# Patient Record
Sex: FEMALE | Race: WHITE | NOT HISPANIC OR LATINO | Employment: OTHER | URBAN - METROPOLITAN AREA
[De-identification: names, ages, dates, MRNs, and addresses within clinical notes are randomized per-mention and may not be internally consistent; named-entity substitution may affect disease eponyms.]

---

## 2017-04-05 ENCOUNTER — ALLSCRIPTS OFFICE VISIT (OUTPATIENT)
Dept: OTHER | Facility: OTHER | Age: 67
End: 2017-04-05

## 2017-04-10 RX ORDER — LOSARTAN POTASSIUM 25 MG/1
25 TABLET ORAL DAILY
COMMUNITY

## 2017-04-10 RX ORDER — ATORVASTATIN CALCIUM 40 MG/1
40 TABLET, FILM COATED ORAL DAILY
COMMUNITY

## 2017-04-10 RX ORDER — ASPIRIN 81 MG/1
81 TABLET ORAL DAILY
COMMUNITY

## 2017-04-10 RX ORDER — CARVEDILOL 12.5 MG/1
12.5 TABLET ORAL 2 TIMES DAILY WITH MEALS
COMMUNITY

## 2017-04-10 RX ORDER — CLOPIDOGREL BISULFATE 75 MG/1
75 TABLET ORAL DAILY
COMMUNITY

## 2017-04-16 ENCOUNTER — ANESTHESIA EVENT (OUTPATIENT)
Dept: PERIOP | Facility: HOSPITAL | Age: 67
End: 2017-04-16
Payer: MEDICARE

## 2017-04-17 ENCOUNTER — HOSPITAL ENCOUNTER (OUTPATIENT)
Facility: HOSPITAL | Age: 67
Setting detail: OUTPATIENT SURGERY
Discharge: HOME | End: 2017-04-17
Attending: INTERNAL MEDICINE | Admitting: INTERNAL MEDICINE
Payer: MEDICARE

## 2017-04-17 ENCOUNTER — ANESTHESIA (OUTPATIENT)
Dept: PERIOP | Facility: HOSPITAL | Age: 67
End: 2017-04-17
Payer: MEDICARE

## 2017-04-17 ENCOUNTER — GENERIC CONVERSION - ENCOUNTER (OUTPATIENT)
Dept: OTHER | Facility: OTHER | Age: 67
End: 2017-04-17

## 2017-04-17 VITALS
OXYGEN SATURATION: 99 % | RESPIRATION RATE: 20 BRPM | TEMPERATURE: 97 F | HEART RATE: 62 BPM | WEIGHT: 168.65 LBS | HEIGHT: 68 IN | BODY MASS INDEX: 25.56 KG/M2 | DIASTOLIC BLOOD PRESSURE: 65 MMHG | SYSTOLIC BLOOD PRESSURE: 131 MMHG

## 2017-04-17 DIAGNOSIS — R09.89 GLOBUS SENSATION: ICD-10-CM

## 2017-04-17 DIAGNOSIS — K22.89 ESOPHAGEAL THICKENING: ICD-10-CM

## 2017-04-17 PROCEDURE — 88342 IMHCHEM/IMCYTCHM 1ST ANTB: CPT | Performed by: INTERNAL MEDICINE

## 2017-04-17 PROCEDURE — 88305 TISSUE EXAM BY PATHOLOGIST: CPT | Performed by: INTERNAL MEDICINE

## 2017-04-17 RX ORDER — SODIUM CHLORIDE, SODIUM LACTATE, POTASSIUM CHLORIDE, CALCIUM CHLORIDE 600; 310; 30; 20 MG/100ML; MG/100ML; MG/100ML; MG/100ML
125 INJECTION, SOLUTION INTRAVENOUS CONTINUOUS
Status: DISCONTINUED | OUTPATIENT
Start: 2017-04-17 | End: 2017-04-17 | Stop reason: HOSPADM

## 2017-04-17 RX ORDER — PROPOFOL 10 MG/ML
INJECTION, EMULSION INTRAVENOUS AS NEEDED
Status: DISCONTINUED | OUTPATIENT
Start: 2017-04-17 | End: 2017-04-17 | Stop reason: SURG

## 2017-04-17 RX ADMIN — PROPOFOL 100 MG: 10 INJECTION, EMULSION INTRAVENOUS at 10:34

## 2017-04-17 RX ADMIN — SODIUM CHLORIDE, SODIUM LACTATE, POTASSIUM CHLORIDE, AND CALCIUM CHLORIDE: .6; .31; .03; .02 INJECTION, SOLUTION INTRAVENOUS at 09:45

## 2017-04-17 RX ADMIN — PROPOFOL 20 MG: 10 INJECTION, EMULSION INTRAVENOUS at 10:37

## 2017-04-20 ENCOUNTER — GENERIC CONVERSION - ENCOUNTER (OUTPATIENT)
Dept: OTHER | Facility: OTHER | Age: 67
End: 2017-04-20

## 2017-07-06 DIAGNOSIS — K29.70 GASTRITIS WITHOUT BLEEDING: ICD-10-CM

## 2017-12-06 ENCOUNTER — GENERIC CONVERSION - ENCOUNTER (OUTPATIENT)
Dept: OTHER | Facility: OTHER | Age: 67
End: 2017-12-06

## 2018-01-10 NOTE — RESULT NOTES
Verified Results  (1) TISSUE EXAM [id number] 10:35AM Jazzy Hunt     Test Name Result Flag Reference   LAB AP CASE REPORT (Report)     Surgical Pathology Report             Case: [id number]                   Authorizing Provider: Irma Patiño MD  Collected:      04/17/2017 1035        Ordering Location:   [facility] Received:      04/17/2017 1304                    Operating Room                                 Pathologist:      Karlene Schlatter, MD                               Specimens:  A) - Duodenum, Duodenum, r/o C sprue                                  B) - Stomach, Stomach, r/o H pylori   LAB AP FINAL DIAGNOSIS (Report)     A  Duodenum, biopsy:   - No significant pathologic abnormalities   - Features of gluten-sensitive enteropathy (celiac disease) and other   malabsorptive enteropathies are not identified   - No active inflammation, granulomas, dysplasia, or malignancy identified    B  Stomach, biopsy:   - Chronic active H pylori gastritis   - Numerous H pylori organisms are identified on immunostain and H&E   - No intestinal metaplasia, dysplasia, or malignancy identified    Electronically signed by Karlene Schlatter, MD on 4/19/2017 at 11:48 AM   LAB AP NOTE      Immunohistochemical stains are performed with adequate controls  LAB AP SURGICAL ADDITIONAL INFORMATION (Report)     These tests were developed and their performance characteristics   determined by [facility] or [employer]  They may not be cleared or approved by the [place]  Food and   Drug Administration  The FDA has determined that such clearance or   approval is not necessary  These tests are used for clinical purposes  They should not be regarded as investigational or for research  This   laboratory has been approved by CLIA 88, designated as a high-complexity   laboratory and is qualified to perform these tests      Interpretation performed at [place] [address]  [facility]   [id number]   LAB AP GROSS DESCRIPTION (Report)     A  The specimen is received in formalin, labeled with the patient's name   and hospital number, and is designated duodenum biopsy  The specimen   consists of 4 tan-pink soft tissue fragments measuring 0 2 cm, 0 2 cm, 0 3   cm, and 0 3 cm in greatest dimension  Entirely submitted  One cassette  B  The specimen is received in formalin, labeled with the patient's name   and hospital number, and is designated stomach biopsy  The specimen   consists of multiple tan-pink soft tissue fragments measuring in aggregate   0 4 x 0 4 x 0 2 cm  Entirely submitted  One cassette  Note: The estimated total formalin fixation time based upon information   provided by the submitting clinician and the standard processing schedule   is 27 5 hours    [facility]   LAB AP CLINICAL INFORMATION      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

## 2018-01-13 VITALS
HEIGHT: 68 IN | DIASTOLIC BLOOD PRESSURE: 80 MMHG | WEIGHT: 168.13 LBS | SYSTOLIC BLOOD PRESSURE: 122 MMHG | BODY MASS INDEX: 25.48 KG/M2 | HEART RATE: 64 BPM

## 2022-06-09 ENCOUNTER — APPOINTMENT (EMERGENCY)
Dept: RADIOLOGY | Facility: HOSPITAL | Age: 72
DRG: 189 | End: 2022-06-09
Payer: MEDICARE

## 2022-06-09 ENCOUNTER — HOSPITAL ENCOUNTER (INPATIENT)
Facility: HOSPITAL | Age: 72
LOS: 2 days | Discharge: HOME | DRG: 189 | End: 2022-06-11
Attending: EMERGENCY MEDICINE | Admitting: INTERNAL MEDICINE
Payer: MEDICARE

## 2022-06-09 DIAGNOSIS — R09.02 HYPOXIA: Primary | ICD-10-CM

## 2022-06-09 DIAGNOSIS — J44.1 COPD WITH ACUTE EXACERBATION (HCC): ICD-10-CM

## 2022-06-09 PROBLEM — I50.32 CHRONIC DIASTOLIC CHF (CONGESTIVE HEART FAILURE) (HCC): Status: ACTIVE | Noted: 2019-11-29

## 2022-06-09 PROBLEM — I25.10 CORONARY ARTERY DISEASE: Status: ACTIVE | Noted: 2022-06-09

## 2022-06-09 PROBLEM — J96.01 ACUTE RESPIRATORY FAILURE WITH HYPOXIA (HCC): Status: ACTIVE | Noted: 2022-06-09

## 2022-06-09 PROBLEM — Z85.9 HISTORY OF CANCER: Status: ACTIVE | Noted: 2022-06-09

## 2022-06-09 LAB
2HR DELTA HS TROPONIN: -1 NG/L
4HR DELTA HS TROPONIN: -2 NG/L
ALBUMIN SERPL BCP-MCNC: 2.6 G/DL (ref 3.5–5)
ALP SERPL-CCNC: 49 U/L (ref 46–116)
ALT SERPL W P-5'-P-CCNC: 19 U/L (ref 12–78)
ANION GAP SERPL CALCULATED.3IONS-SCNC: 5 MMOL/L (ref 4–13)
AST SERPL W P-5'-P-CCNC: 8 U/L (ref 5–45)
ATRIAL RATE: 75 BPM
BASOPHILS # BLD AUTO: 0.01 THOUSANDS/ΜL (ref 0–0.1)
BASOPHILS NFR BLD AUTO: 0 % (ref 0–1)
BILIRUB DIRECT SERPL-MCNC: 0.27 MG/DL (ref 0–0.2)
BILIRUB SERPL-MCNC: 1.1 MG/DL (ref 0.2–1)
BILIRUB UR QL STRIP: NEGATIVE
BUN SERPL-MCNC: 14 MG/DL (ref 5–25)
CALCIUM SERPL-MCNC: 8.3 MG/DL (ref 8.3–10.1)
CARDIAC TROPONIN I PNL SERPL HS: 3 NG/L
CARDIAC TROPONIN I PNL SERPL HS: 4 NG/L
CARDIAC TROPONIN I PNL SERPL HS: 5 NG/L
CHLORIDE SERPL-SCNC: 105 MMOL/L (ref 100–108)
CLARITY UR: CLEAR
CO2 SERPL-SCNC: 31 MMOL/L (ref 21–32)
COLOR UR: YELLOW
CREAT SERPL-MCNC: 0.64 MG/DL (ref 0.6–1.3)
D DIMER PPP FEU-MCNC: <0.27 UG/ML FEU
EOSINOPHIL # BLD AUTO: 0.02 THOUSAND/ΜL (ref 0–0.61)
EOSINOPHIL NFR BLD AUTO: 0 % (ref 0–6)
ERYTHROCYTE [DISTWIDTH] IN BLOOD BY AUTOMATED COUNT: 14.4 % (ref 11.6–15.1)
FLUAV RNA RESP QL NAA+PROBE: NEGATIVE
FLUBV RNA RESP QL NAA+PROBE: NEGATIVE
GFR SERPL CREATININE-BSD FRML MDRD: 89 ML/MIN/1.73SQ M
GLUCOSE SERPL-MCNC: 78 MG/DL (ref 65–140)
GLUCOSE UR STRIP-MCNC: NEGATIVE MG/DL
HCT VFR BLD AUTO: 49 % (ref 34.8–46.1)
HGB BLD-MCNC: 16.1 G/DL (ref 11.5–15.4)
HGB UR QL STRIP.AUTO: NEGATIVE
IMM GRANULOCYTES # BLD AUTO: 0.03 THOUSAND/UL (ref 0–0.2)
IMM GRANULOCYTES NFR BLD AUTO: 0 % (ref 0–2)
KETONES UR STRIP-MCNC: NEGATIVE MG/DL
LEUKOCYTE ESTERASE UR QL STRIP: NEGATIVE
LYMPHOCYTES # BLD AUTO: 0.72 THOUSANDS/ΜL (ref 0.6–4.47)
LYMPHOCYTES NFR BLD AUTO: 7 % (ref 14–44)
MCH RBC QN AUTO: 32.5 PG (ref 26.8–34.3)
MCHC RBC AUTO-ENTMCNC: 32.9 G/DL (ref 31.4–37.4)
MCV RBC AUTO: 99 FL (ref 82–98)
MONOCYTES # BLD AUTO: 0.51 THOUSAND/ΜL (ref 0.17–1.22)
MONOCYTES NFR BLD AUTO: 5 % (ref 4–12)
NEUTROPHILS # BLD AUTO: 8.45 THOUSANDS/ΜL (ref 1.85–7.62)
NEUTS SEG NFR BLD AUTO: 88 % (ref 43–75)
NITRITE UR QL STRIP: NEGATIVE
NRBC BLD AUTO-RTO: 0 /100 WBCS
NT-PROBNP SERPL-MCNC: 192 PG/ML
P AXIS: 28 DEGREES
PH UR STRIP.AUTO: 6.5 [PH]
PLATELET # BLD AUTO: 144 THOUSANDS/UL (ref 149–390)
PMV BLD AUTO: 9.5 FL (ref 8.9–12.7)
POTASSIUM SERPL-SCNC: 4.5 MMOL/L (ref 3.5–5.3)
PR INTERVAL: 140 MS
PROCALCITONIN SERPL-MCNC: 0.05 NG/ML
PROT SERPL-MCNC: 5.9 G/DL (ref 6.4–8.2)
PROT UR STRIP-MCNC: NEGATIVE MG/DL
QRS AXIS: 78 DEGREES
QRSD INTERVAL: 76 MS
QT INTERVAL: 364 MS
QTC INTERVAL: 406 MS
RBC # BLD AUTO: 4.96 MILLION/UL (ref 3.81–5.12)
RSV RNA RESP QL NAA+PROBE: NEGATIVE
SARS-COV-2 RNA RESP QL NAA+PROBE: NEGATIVE
SODIUM SERPL-SCNC: 141 MMOL/L (ref 136–145)
SP GR UR STRIP.AUTO: 1.02 (ref 1–1.03)
T WAVE AXIS: 74 DEGREES
UROBILINOGEN UR QL STRIP.AUTO: 0.2 E.U./DL
VENTRICULAR RATE: 75 BPM
WBC # BLD AUTO: 9.74 THOUSAND/UL (ref 4.31–10.16)

## 2022-06-09 PROCEDURE — 80048 BASIC METABOLIC PNL TOTAL CA: CPT | Performed by: EMERGENCY MEDICINE

## 2022-06-09 PROCEDURE — 99291 CRITICAL CARE FIRST HOUR: CPT | Performed by: EMERGENCY MEDICINE

## 2022-06-09 PROCEDURE — 85025 COMPLETE CBC W/AUTO DIFF WBC: CPT | Performed by: EMERGENCY MEDICINE

## 2022-06-09 PROCEDURE — 84484 ASSAY OF TROPONIN QUANT: CPT | Performed by: EMERGENCY MEDICINE

## 2022-06-09 PROCEDURE — 81003 URINALYSIS AUTO W/O SCOPE: CPT | Performed by: EMERGENCY MEDICINE

## 2022-06-09 PROCEDURE — 93005 ELECTROCARDIOGRAM TRACING: CPT

## 2022-06-09 PROCEDURE — 36415 COLL VENOUS BLD VENIPUNCTURE: CPT | Performed by: EMERGENCY MEDICINE

## 2022-06-09 PROCEDURE — 94760 N-INVAS EAR/PLS OXIMETRY 1: CPT

## 2022-06-09 PROCEDURE — 84145 PROCALCITONIN (PCT): CPT | Performed by: EMERGENCY MEDICINE

## 2022-06-09 PROCEDURE — 93010 ELECTROCARDIOGRAM REPORT: CPT | Performed by: INTERNAL MEDICINE

## 2022-06-09 PROCEDURE — 85379 FIBRIN DEGRADATION QUANT: CPT | Performed by: EMERGENCY MEDICINE

## 2022-06-09 PROCEDURE — 99285 EMERGENCY DEPT VISIT HI MDM: CPT

## 2022-06-09 PROCEDURE — 1124F ACP DISCUSS-NO DSCNMKR DOCD: CPT | Performed by: EMERGENCY MEDICINE

## 2022-06-09 PROCEDURE — 94644 CONT INHLJ TX 1ST HOUR: CPT

## 2022-06-09 PROCEDURE — 80076 HEPATIC FUNCTION PANEL: CPT | Performed by: EMERGENCY MEDICINE

## 2022-06-09 PROCEDURE — 83880 ASSAY OF NATRIURETIC PEPTIDE: CPT | Performed by: EMERGENCY MEDICINE

## 2022-06-09 PROCEDURE — 99223 1ST HOSP IP/OBS HIGH 75: CPT | Performed by: INTERNAL MEDICINE

## 2022-06-09 PROCEDURE — 96361 HYDRATE IV INFUSION ADD-ON: CPT

## 2022-06-09 PROCEDURE — 96360 HYDRATION IV INFUSION INIT: CPT

## 2022-06-09 PROCEDURE — 94664 DEMO&/EVAL PT USE INHALER: CPT

## 2022-06-09 PROCEDURE — 71046 X-RAY EXAM CHEST 2 VIEWS: CPT

## 2022-06-09 PROCEDURE — 0241U HB NFCT DS VIR RESP RNA 4 TRGT: CPT | Performed by: EMERGENCY MEDICINE

## 2022-06-09 RX ORDER — CARVEDILOL 12.5 MG/1
12.5 TABLET ORAL 2 TIMES DAILY WITH MEALS
Status: DISCONTINUED | OUTPATIENT
Start: 2022-06-09 | End: 2022-06-11 | Stop reason: HOSPADM

## 2022-06-09 RX ORDER — NICOTINE 21 MG/24HR
1 PATCH, TRANSDERMAL 24 HOURS TRANSDERMAL DAILY
Status: DISCONTINUED | OUTPATIENT
Start: 2022-06-10 | End: 2022-06-11 | Stop reason: HOSPADM

## 2022-06-09 RX ORDER — CLOPIDOGREL BISULFATE 75 MG/1
75 TABLET ORAL DAILY
Status: DISCONTINUED | OUTPATIENT
Start: 2022-06-10 | End: 2022-06-11 | Stop reason: HOSPADM

## 2022-06-09 RX ORDER — ALBUTEROL SULFATE 90 UG/1
2 AEROSOL, METERED RESPIRATORY (INHALATION) EVERY 6 HOURS PRN
Status: DISCONTINUED | OUTPATIENT
Start: 2022-06-09 | End: 2022-06-11 | Stop reason: HOSPADM

## 2022-06-09 RX ORDER — METHYLPREDNISOLONE SODIUM SUCCINATE 40 MG/ML
40 INJECTION, POWDER, LYOPHILIZED, FOR SOLUTION INTRAMUSCULAR; INTRAVENOUS EVERY 8 HOURS SCHEDULED
Status: DISCONTINUED | OUTPATIENT
Start: 2022-06-10 | End: 2022-06-10

## 2022-06-09 RX ORDER — SODIUM CHLORIDE FOR INHALATION 0.9 %
12 VIAL, NEBULIZER (ML) INHALATION ONCE
Status: COMPLETED | OUTPATIENT
Start: 2022-06-09 | End: 2022-06-09

## 2022-06-09 RX ORDER — ENOXAPARIN SODIUM 100 MG/ML
40 INJECTION SUBCUTANEOUS DAILY
Status: DISCONTINUED | OUTPATIENT
Start: 2022-06-10 | End: 2022-06-11 | Stop reason: HOSPADM

## 2022-06-09 RX ORDER — METHYLPREDNISOLONE SODIUM SUCCINATE 125 MG/2ML
125 INJECTION, POWDER, LYOPHILIZED, FOR SOLUTION INTRAMUSCULAR; INTRAVENOUS ONCE
Status: COMPLETED | OUTPATIENT
Start: 2022-06-09 | End: 2022-06-09

## 2022-06-09 RX ORDER — LEVALBUTEROL 1.25 MG/.5ML
1.25 SOLUTION, CONCENTRATE RESPIRATORY (INHALATION)
Status: DISCONTINUED | OUTPATIENT
Start: 2022-06-09 | End: 2022-06-09

## 2022-06-09 RX ORDER — ASPIRIN 81 MG/1
81 TABLET ORAL DAILY
Status: DISCONTINUED | OUTPATIENT
Start: 2022-06-10 | End: 2022-06-11 | Stop reason: HOSPADM

## 2022-06-09 RX ORDER — ACETAMINOPHEN 325 MG/1
650 TABLET ORAL EVERY 6 HOURS PRN
Status: DISCONTINUED | OUTPATIENT
Start: 2022-06-09 | End: 2022-06-11 | Stop reason: HOSPADM

## 2022-06-09 RX ORDER — ONDANSETRON 2 MG/ML
4 INJECTION INTRAMUSCULAR; INTRAVENOUS EVERY 6 HOURS PRN
Status: DISCONTINUED | OUTPATIENT
Start: 2022-06-09 | End: 2022-06-11 | Stop reason: HOSPADM

## 2022-06-09 RX ORDER — LOSARTAN POTASSIUM 25 MG/1
25 TABLET ORAL DAILY
Status: DISCONTINUED | OUTPATIENT
Start: 2022-06-10 | End: 2022-06-11 | Stop reason: HOSPADM

## 2022-06-09 RX ORDER — ATORVASTATIN CALCIUM 40 MG/1
40 TABLET, FILM COATED ORAL DAILY
Status: DISCONTINUED | OUTPATIENT
Start: 2022-06-10 | End: 2022-06-11 | Stop reason: HOSPADM

## 2022-06-09 RX ADMIN — IPRATROPIUM BROMIDE 1 MG: 0.5 SOLUTION RESPIRATORY (INHALATION) at 13:29

## 2022-06-09 RX ADMIN — ALBUTEROL SULFATE 10 MG: 2.5 SOLUTION RESPIRATORY (INHALATION) at 13:29

## 2022-06-09 RX ADMIN — METHYLPREDNISOLONE SODIUM SUCCINATE 125 MG: 125 INJECTION, POWDER, FOR SOLUTION INTRAMUSCULAR; INTRAVENOUS at 17:43

## 2022-06-09 RX ADMIN — CARVEDILOL 12.5 MG: 12.5 TABLET, FILM COATED ORAL at 17:44

## 2022-06-09 RX ADMIN — SODIUM CHLORIDE 1000 ML: 0.9 INJECTION, SOLUTION INTRAVENOUS at 13:16

## 2022-06-09 RX ADMIN — ISODIUM CHLORIDE 12 ML: 0.03 SOLUTION RESPIRATORY (INHALATION) at 13:29

## 2022-06-09 RX ADMIN — DICLOFENAC SODIUM TOPICAL GEL, 1%, 2 G: 10 GEL TOPICAL at 19:43

## 2022-06-09 RX ADMIN — AZITHROMYCIN MONOHYDRATE 500 MG: 500 INJECTION, POWDER, LYOPHILIZED, FOR SOLUTION INTRAVENOUS at 17:44

## 2022-06-09 NOTE — ED PROVIDER NOTES
History  Chief Complaint   Patient presents with    Shortness of Breath     Patient c/o of shortness of breath  Patient was seen by PCP and sent to ED for evaluation  Patient oxygen saturation 88% room air  History provided by:  Patient   used: No    Shortness of Breath  Severity:  Severe  Onset quality:  Gradual  Duration:  1 week  Timing:  Constant  Progression:  Worsening  Chronicity:  New  Relieved by:  Nothing  Worsened by:  Exertion  Ineffective treatments:  Inhaler and oxygen  Associated symptoms: cough and wheezing    Associated symptoms: no abdominal pain, no chest pain, no ear pain, no fever, no neck pain, no rash, no sore throat and no vomiting        Prior to Admission Medications   Prescriptions Last Dose Informant Patient Reported? Taking?   aspirin (ECOTRIN LOW STRENGTH) 81 mg EC tablet   Yes No   Sig: Take 81 mg by mouth daily   atorvastatin (LIPITOR) 40 mg tablet   Yes No   Sig: Take 40 mg by mouth daily   carvedilol (COREG) 12 5 mg tablet   Yes No   Sig: Take 12 5 mg by mouth 2 (two) times a day with meals   clopidogrel (PLAVIX) 75 mg tablet   Yes No   Sig: Take 75 mg by mouth daily   losartan (COZAAR) 25 mg tablet   Yes No   Sig: Take 25 mg by mouth daily      Facility-Administered Medications: None       Past Medical History:   Diagnosis Date    Cancer ([facility] )     bladder    COPD (chronic obstructive pulmonary disease) (HCC)     Hyperlipidemia     Hypertension     [facility]     2010       Past Surgical History:   Procedure Laterality Date    BLADDER SURGERY      CORONARY ARTERY BYPASS GRAFT      DENTAL SURGERY      HYSTERECTOMY      IA ESOPHAGOGASTRODUODENOSCOPY TRANSORAL DIAGNOSTIC N/A 4/17/2017    Procedure: ESOPHAGOGASTRODUODENOSCOPY (EGD); Surgeon: Jennifer Malik MD;  Location: [facility]; Service: Gastroenterology       History reviewed  No pertinent family history    I have reviewed and agree with the history as documented  E-Cigarette/Vaping    E-Cigarette Use Never User      E-Cigarette/Vaping Substances     Social History     Tobacco Use    Smoking status: Current Every Day Smoker     Packs/day: 0 50     Types: Cigarettes    Smokeless tobacco: Never Used   Vaping Use    Vaping Use: Never used   Substance Use Topics    Alcohol use: Yes     Comment: socailly    Drug use: No       Review of Systems   Constitutional: Negative for chills and fever  HENT: Negative for ear pain and sore throat  Eyes: Negative for pain and visual disturbance  Respiratory: Positive for cough, chest tightness, shortness of breath and wheezing  Cardiovascular: Negative for chest pain and palpitations  Gastrointestinal: Negative for abdominal pain, nausea and vomiting  Genitourinary: Negative for dysuria and hematuria  Musculoskeletal: Negative for arthralgias, back pain, neck pain and neck stiffness  Skin: Negative for color change and rash  Neurological: Negative for seizures and syncope  All other systems reviewed and are negative  Physical Exam  Physical Exam  Vitals and nursing note reviewed  Constitutional:       General: She is not in acute distress  Appearance: She is well-developed  HENT:      Head: Normocephalic and atraumatic  Eyes:      Conjunctiva/sclera: Conjunctivae normal    Cardiovascular:      Rate and Rhythm: Normal rate and regular rhythm  Heart sounds: No murmur heard  Pulmonary:      Effort: Tachypnea and respiratory distress present  Breath sounds: Examination of the right-middle field reveals decreased breath sounds and wheezing  Examination of the left-middle field reveals decreased breath sounds and wheezing  Examination of the right-lower field reveals decreased breath sounds, wheezing and rhonchi  Examination of the left-lower field reveals decreased breath sounds, wheezing and rhonchi  Decreased breath sounds, wheezing and rhonchi present     Abdominal: Palpations: Abdomen is soft  Tenderness: There is no abdominal tenderness  Musculoskeletal:      Cervical back: Neck supple  Skin:     General: Skin is warm and dry  Capillary Refill: Capillary refill takes less than 2 seconds  Neurological:      General: No focal deficit present  Mental Status: She is alert and oriented to person, place, and time           Vital Signs  ED Triage Vitals [06/09/22 1223]   Temperature Pulse Respirations Blood Pressure SpO2   98 4 °F (36 9 °C) 84 22 165/74 (!) 89 %      Temp Source Heart Rate Source Patient Position - Orthostatic VS BP Location FiO2 (%)   Tympanic Monitor Sitting Left arm --      Pain Score       --           Vitals:    06/09/22 1223 06/09/22 1330 06/09/22 1559   BP: 165/74 131/67 144/67   Pulse: 84 76 78   Patient Position - Orthostatic VS: Sitting Lying Sitting         Visual Acuity      ED Medications  Medications   methylPREDNISolone sodium succinate (Solu-MEDROL) injection 125 mg (has no administration in time range)   sodium chloride 0 9 % bolus 1,000 mL (0 mL Intravenous Stopped 6/9/22 1515)   albuterol inhalation solution 10 mg (10 mg Nebulization Given 6/9/22 1329)   ipratropium (ATROVENT) 0 02 % inhalation solution 1 mg (1 mg Nebulization Given 6/9/22 1329)   sodium chloride 0 9 % inhalation solution 12 mL (12 mL Nebulization Given 6/9/22 1329)       Diagnostic Studies  Results Reviewed     Procedure Component Value Units Date/Time    UA (URINE) with reflex to Scope [[id number]] Collected: 06/09/22 1610    Lab Status: Final result Specimen: Urine, Clean Catch Updated: 06/09/22 1630     Color, UA Yellow     Clarity, UA Clear     Specific [place], UA 1 020     pH, UA 6 5     Leukocytes, UA Negative     Nitrite, UA Negative     Protein, UA Negative mg/dl      Glucose, UA Negative mg/dl      Ketones, UA Negative mg/dl      Urobilinogen, UA 0 2 E U /dl      Bilirubin, UA Negative     Blood, UA Negative    Procalcitonin [[id number]]     Lab Status: No result Specimen: Blood     HS Troponin I 2hr [[id number]]  (Normal) Collected: 06/09/22 1538    Lab Status: Final result Specimen: Blood from Arm, Right Updated: 06/09/22 1611     hs TnI 2hr 4 ng/L      Delta 2hr hsTnI -1 ng/L     HS Troponin I 4hr [[id number]]     Lab Status: No result Specimen: Blood     COVID/FLU/RSV - 2 hour TAT [[id number]]  (Normal) Collected: 06/09/22 1317    Lab Status: Final result Specimen: Nares from Nose Updated: 06/09/22 1403     SARS-CoV-2 Negative     INFLUENZA A PCR Negative     INFLUENZA B PCR Negative     RSV PCR Negative    Narrative:      FOR PEDIATRIC PATIENTS - copy/paste COVID Guidelines URL to browser: [email or web address]  TOM[employer]    SARS-CoV-2 assay is a Nucleic Acid Amplification assay intended for the  qualitative detection of nucleic acid from SARS-CoV-2 in nasopharyngeal  swabs  Results are for the presumptive identification of SARS-CoV-2 RNA  Positive results are indicative of infection with SARS-CoV-2, the virus  causing COVID-19, but do not rule out bacterial infection or co-infection  with other viruses  Laboratories within the [place] and its  territories are required to report all positive results to the appropriate  public health authorities  Negative results do not preclude SARS-CoV-2  infection and should not be used as the sole basis for treatment or other  patient management decisions  Negative results must be combined with  clinical observations, patient history, and epidemiological information  This test has not been FDA cleared or approved  This test has been authorized by FDA under an Emergency Use Authorization  (EUA)   This test is only authorized for the duration of time the  declaration that circumstances exist justifying the authorization of the  emergency use of an in vitro diagnostic tests for detection of SARS-CoV-2  virus and/or diagnosis of COVID-19 infection under section 564(b)(1) of  the Act, 21 U  S C  416DWR-2(U)(2), unless the authorization is terminated  or revoked sooner  The test has been validated but independent review by FDA  and CLIA is pending  Test performed using [employer] Gene[employer]: This RT-PCR assay targets N2,  a region unique to SARS-CoV-2  A conserved region in the E-gene was chosen  for pan-Sarbecovirus detection which includes SARS-CoV-2  HS Troponin 0hr (reflex protocol) [[id number]]  (Normal) Collected: 06/09/22 1317    Lab Status: Final result Specimen: Blood from Arm, Right Updated: 06/09/22 1357     hs TnI 0hr 5 ng/L     Hepatic function panel [[id number]]  (Abnormal) Collected: 06/09/22 1317    Lab Status: Final result Specimen: Blood from Arm, Right Updated: 06/09/22 1355     Total Bilirubin 1 10 mg/dL      Bilirubin, Direct 0 27 mg/dL      Alkaline Phosphatase 49 U/L      AST 8 U/L      ALT 19 U/L      Total Protein 5 9 g/dL      Albumin 2 6 g/dL     NT-BNP PRO [[id number]]  (Abnormal) Collected: 06/09/22 1317    Lab Status: Final result Specimen: Blood from Arm, Right Updated: 06/09/22 1355     NT-proBNP 192 pg/mL     D-Dimer [[id number]]  (Normal) Collected: 06/09/22 1317    Lab Status: Final result Specimen: Blood from Arm, Right Updated: 06/09/22 1354     D-Dimer, Quant <0 27 ug/ml FEU     Narrative: In the evaluation for possible pulmonary embolism, in the appropriate (Well's Score of 4 or less) patient, the age adjusted d-dimer cutoff for this patient can be calculated as:    Age x 0 01 (in ug/mL) for Age-adjusted D-dimer exclusion threshold for a patient over 50 years      Basic metabolic panel [[id number]] Collected: 06/09/22 1317    Lab Status: Final result Specimen: Blood from Arm, Right Updated: 06/09/22 1346     Sodium 141 mmol/L      Potassium 4 5 mmol/L      Chloride 105 mmol/L      CO2 31 mmol/L      ANION GAP 5 mmol/L      BUN 14 mg/dL      Creatinine 0 64 mg/dL      Glucose 78 mg/dL      Calcium 8 3 mg/dL      eGFR 89 ml/min/1 73sq m Narrative:      National Kidney Disease Foundation guidelines for Chronic Kidney Disease (CKD):     Stage 1 with normal or high GFR (GFR > 90 mL/min/1 73 square meters)    Stage 2 Mild CKD (GFR = 60-89 mL/min/1 73 square meters)    Stage 3A Moderate CKD (GFR = 45-59 mL/min/1 73 square meters)    Stage 3B Moderate CKD (GFR = 30-44 mL/min/1 73 square meters)    Stage 4 Severe CKD (GFR = 15-29 mL/min/1 73 square meters)    Stage 5 End Stage CKD (GFR <15 mL/min/1 73 square meters)  Note: GFR calculation is accurate only with a steady state creatinine    CBC and differential [[id number]]  (Abnormal) Collected: 06/09/22 1317    Lab Status: Final result Specimen: Blood from Arm, Right Updated: 06/09/22 1332     WBC 9 74 Thousand/uL      RBC 4 96 Million/uL      Hemoglobin 16 1 g/dL      Hematocrit 49 0 %      MCV 99 fL      MCH 32 5 pg      MCHC 32 9 g/dL      RDW 14 4 %      MPV 9 5 fL      Platelets 985 Thousands/uL      nRBC 0 /100 WBCs      Neutrophils Relative 88 %      Immat GRANS % 0 %      Lymphocytes Relative 7 %      Monocytes Relative 5 %      Eosinophils Relative 0 %      Basophils Relative 0 %      Neutrophils Absolute 8 45 Thousands/µL      Immature Grans Absolute 0 03 Thousand/uL      Lymphocytes Absolute 0 72 Thousands/µL      Monocytes Absolute 0 51 Thousand/µL      Eosinophils Absolute 0 02 Thousand/µL      Basophils Absolute 0 01 Thousands/µL                  XR chest 2 views   Final Result by Tammy Rivera MD (06/09 1445)      1  No acute cardiopulmonary disease  2   Partially calcified nodule in the left upper lobe, described on multiple prior CTs and consistent with treated malignancy  This should be followed up per oncology protocol        I personally discussed this study with Da Deleon on 6/9/2022 at 2:34 PM                      Workstation performed: [id number]                    Procedures  CriticalCare Time  Performed by: Dennis Garces MD  Authorized by: Dennis Garces MD Critical care provider statement:     Critical care time (minutes):  37    Critical care time was exclusive of:  Separately billable procedures and treating other patients and teaching time    Critical care was necessary to treat or prevent imminent or life-threatening deterioration of the following conditions:  Respiratory failure    Critical care was time spent personally by me on the following activities:  Blood draw for specimens, obtaining history from patient or surrogate, development of treatment plan with patient or surrogate, discussions with consultants, evaluation of patient's response to treatment, examination of patient, ordering and performing treatments and interventions, ordering and review of laboratory studies, ordering and review of radiographic studies, re-evaluation of patient's condition and review of old charts    I assumed direction of critical care for this patient from another provider in my specialty: no    Comments:      COPD exacerbation with hypoxia requiring hour long nebulizer and supplemental oxygen by nasal cannula  ED Course                               SBIRT 22yo+    Flowsheet Row Most Recent Value   SBIRT (23 yo +)    In order to provide better care to our patients, we are screening all of our patients for alcohol and drug use  Would it be okay to ask you these screening questions? Yes Filed at: 06/09/2022 1347   Initial Alcohol Screen: US AUDIT-C     1  How often do you have a drink containing alcohol? 0 Filed at: 06/09/2022 1347   2  How many drinks containing alcohol do you have on a typical day you are drinking? 0 Filed at: 06/09/2022 1347   3a  Male UNDER 65: How often do you have five or more drinks on one occasion? 0 Filed at: 06/09/2022 1347   3b  FEMALE Any Age, or MALE 65+: How often do you have 4 or more drinks on one occassion? 0 Filed at: 06/09/2022 1347   Audit-C Score 0 Filed at: 06/09/2022 1347   AZEB: How many times in the past year have you    Used an illegal drug or used a prescription medication for non-medical reasons? Never Filed at: 06/09/2022 1347                    [facility]  Number of Diagnoses or Management Options  COPD with acute exacerbation [facility]): new and requires workup  Hypoxia: new and requires workup  Diagnosis management comments: 78-year-old female with gradually worsening shortness of breath over the past week  She does have a history of COPD and lung cancer  Lung cancer had been in remission but she is currently being monitored by her pulmonologist for possible recurrence  Was at the pulmonologist's office today and was sent into the ED for hypoxia and increasing oxygen requirements  She was initially rather tachypneic with 1 to 2 word conversational dyspnea and required increased oxygen by nasal cannula  Symptoms improved somewhat with an hour line nebulizer and steroids  Still, after hour long nebulizer patient remains in the lower 90s at rest on 3 L nasal cannula, more than her usual   Will require admission for further eval and treatment of COPD exacerbation  X-ray appears negative for pneumonia  Left upper lobe nodule/mass appears stable compared to previous outpatient imaging         Amount and/or Complexity of Data Reviewed  Clinical lab tests: reviewed and ordered  Tests in the radiology section of CPT®: reviewed and ordered  Review and summarize past medical records: yes  Discuss the patient with other providers: yes  Independent visualization of images, tracings, or specimens: yes        Disposition  Final diagnoses:   Hypoxia   COPD with acute exacerbation ([facility] )     Time reflects when diagnosis was documented in both MDM as applicable and the Disposition within this note     Time User Action Codes Description Comment    6/9/2022  4:28 PM Whitney Rowell Add [R09 02] Hypoxia     6/9/2022  4:28 PM Tania Christine Add [J44 1] COPD with acute exacerbation [facility])       ED Disposition     ED Disposition   Admit    Condition Stable    Date/Time   u Jun 9, 2022  4:28 PM    Comment   Case was discussed with DIMA and the patient's admission status was agreed to be Admission Status: observation status to the service of Dr Marianne Valdovinos  Follow-up Information    None         Patient's Medications   Discharge Prescriptions    No medications on file       No discharge procedures on file      PDMP Review     None          ED Provider  Electronically Signed by           Elvis Swift MD  06/09/22 3980

## 2022-06-09 NOTE — ASSESSMENT & PLAN NOTE
Still smokes on occasion has been a smoker for several years  I have a strong discussion with patient about need for smoking cessation especially in light of her advanced COPD  I have recommended different strategies including nicotine patch + lozenges PRN combination    Patient verbalized understanding

## 2022-06-09 NOTE — H&P
[facility]  H&P Julia White 1950, 66 y o  female MRN: [id number]  Unit/Bed#: ED 23 Encounter: [id number]  Primary Care Provider: Claudette Side, DO   Date and time admitted to hospital: 6/9/2022 12:34 PM    * Acute respiratory failure with hypoxia [facility])  Assessment & Plan  Patient with hypoxemia in the 80s at times  This is due to COPD exacerbation  Provide oxygen supplementation to keep saturation around 92% and wean as tolerated  Treat the exacerbation as above    She does have erythrocytosis which points to the fact that the hypoxia may be chronic  She will need to be tested for O2 requirements prior to discharge    COPD exacerbation [facility])  Assessment & Plan  Patient Sees pulmonologist Nelly Tello MD   at [place] [address] from 09/10/2021: FEV1 is severely reduced to 0 67 L 30% of predicted  Total lung capacity is 4 84 L 89% of predicted residual   volume is increased to 3 26 L 149% of predicted reflecting air trapping hyperinflation  Diffusion capacity is moderately reduced 57% of predicted  - Suggestive of emphysema pattern with severe obstruction  Patient is only on albuterol as needed and given that she is not compliant with  She does not use any maintenance inhaler  She mentions the cost is a problem given the fact that she does not have prescription insurance  Now she presents with worsening shortness of Breath coughing sputum production  She went to her pulmonologist office but she was deemed to be too sick to stay there and was recommended to come to the ED  Patient is on acute COPD exacerbation with acute hypoxemic respiratory failure  Plan:   Will treat COPD exacerbation with nebulized bronchodilators - start Xopenex and ipratropium 3 times a day  Albuterol as needed in between  Start Solu-Medrol 40 mg IV q 8 hours and deescalate depending on clinical status  Will start azithromycin for now, check procalcitonin, is serial procalcitonin is negative will consider discontinuation  Provide oxygen supplementation to keep saturation around 92% no need for hypoxia  At the time of discharge patient will need long-term treatments, likely the best alternative use a nebulizer generic medicine (Pulmicort/Albuterol/Ipratropium) as she is not able to afford inhalers  She will need close follow-up with 1 office once cleared for discharge      Coronary artery disease  Assessment & Plan  Denies any chest pain currently  She has a history of CB as well as PVD  Continue carvedilol, clopidogrel, aspirin, statin    History of cancer  Assessment & Plan  Hx of bladder cancer with lung metastasis  Bladder cancer was treated several years ago with TURBT  Lung met has received RTx  Tobacco use disorder  Assessment & Plan  Still smokes on occasion has been a smoker for several years  I have a strong discussion with patient about need for smoking cessation especially in light of her advanced COPD  I have recommended different strategies including nicotine patch + lozenges PRN combination  Patient verbalized understanding    Essential hypertension  Assessment & Plan  Continue carvedilol, losartan    Chronic diastolic CHF (congestive heart failure) (HCC)  Assessment & Plan  Mentioned In chart however currently stable, does not appear in any volume overload        VTE Prophylaxis: Enoxaparin (Lovenox)  / sequential compression device   Code Status: FC  POLST: POLST form is not discussed and not completed at this time  Discussion with family:  Discussed with  at the bedside    Anticipated Length of Stay:  Patient will be admitted on an Inpatient basis with an anticipated length of stay of  At least 2 midnights  Justification for Hospital Stay: AHRF, COPD exacerbation    Total Time for Visit, including Counseling / Coordination of Care: 45 minutes  Greater than 50% of this total time spent on direct patient counseling and coordination of care      Chief Complaint:   SOB, cough    History of Present Illness:    Lex Barger is a 66 y o  female who presents with shortness of breath and cough  The patient is a 21-year-old female with a history of COPD and a chronic smoker, not compliant with her inhaler regimen, now presents with worsening cough and shortness of breath  Patient follows with pulmonologist from [facility] and she went for an appointment today, at that office she was noted to have severe wheezing with difficulty completing sentences and she was recommended to come to the ED, she decided to come to our ED  At our mention pressure is noted to be in respiratory distress, saturating in the low 80s at times  She had received nebulized bronchodilators as well as IV steroids and now can have further conversation  Patient states that for the past week at least she has been feeling progressively worsening shortness of breath on exertion and fatigue  This is also associated worsening and increasing cough with sometimes productive whitish sputum  She endorses still smoking for the past several years  She does mention that she does have an albuterol inhaler at home but she does not use that as often and when she does she does not think that helped much  I have asked her if she is using any type of cough maintenance inhaler and she does not use any as she mentions that she does not have prescription insurance and that the cost is prohibitive  Respiratory history is also concerning for history of previous lung metastasis from a primary bladder cancer, the better cancer itself was treated in several years ago with resection of bladder tumor, subsequently the lung metastasis was treated with local radiation  Patient states that these issues are currently well controlled  On a spirometry done and September 2021 did reveal an FEV1 of 0 67 30% of predicted as well mass DLCO 57%                  Review of Systems:    Review of Systems Constitutional: Positive for fatigue  Respiratory: Positive for cough and shortness of breath  All other systems reviewed and are negative  Past Medical and Surgical History:     Past Medical History:   Diagnosis Date    Cancer [facility])     bladder    COPD [facility]ch[facility] disease) [facility]     Hyperlipidemia     Hypertension     Myocardial infarction ([facility] )     2010       Past Surgical History:   Procedure Laterality Date    BLADDER SURGERY      CORONARY ARTERY BYPASS GRAFT      DENTAL SURGERY      HYSTERECTOMY      RI ESOPHAGOGASTRODUODENOSCOPY TRANSORAL DIAGNOSTIC N/A 4/17/2017    Procedure: ESOPHAGOGASTRODUODENOSCOPY (EGD); Surgeon: Meena Shepherd MD;  Location: [facility]; Service: Gastroenterology       Meds/Allergies:    Prior to Admission medications    Medication Sig Start Date End Date Taking? Authorizing Provider   aspirin (ECOTRIN LOW STRENGTH) 81 mg EC tablet Take 81 mg by mouth daily    Historical Provider, MD   atorvastatin (LIPITOR) 40 mg tablet Take 40 mg by mouth daily    Historical Provider, MD   carvedilol (COREG) 12 5 mg tablet Take 12 5 mg by mouth 2 (two) times a day with meals    Historical Provider, MD   clopidogrel (PLAVIX) 75 mg tablet Take 75 mg by mouth daily    Historical Provider, MD   losartan (COZAAR) 25 mg tablet Take 25 mg by mouth daily    Historical Provider, MD     I have reviewed home medications with patient personally  Allergies:    Allergies   Allergen Reactions    Ace Inhibitors Other (See Comments)       Social History:     Marital Status: /Civil Union   Substance Use History:   Social History     Substance and Sexual Activity   Alcohol Use Yes    Comment: socailly     Social History     Tobacco Use   Smoking Status Current Every Day Smoker    Packs/day: 0 50    Types: Cigarettes   Smokeless Tobacco Never Used     Social History     Substance and Sexual Activity   Drug Use No       Family History:    non-contributory    Physical Exam:     Vitals:   Blood Pressure: 136/88 (06/09/22 1731)  Pulse: 88 (06/09/22 1731)  Temperature: 98 4 °F (36 9 °C) (06/09/22 1223)  Temp Source: Tympanic (06/09/22 1223)  Respirations: 17 (06/09/22 1731)  SpO2: 95 % (06/09/22 1731)    Physical Exam  Vitals and nursing note reviewed  Constitutional:       General: She is in acute distress  Appearance: Normal appearance  She is normal weight  She is diaphoretic  Comments: Female in bed, on oxygen cannula, short of breath   HENT:      Head: Normocephalic and atraumatic  Right Ear: External ear normal       Left Ear: External ear normal       Nose: Nose normal  No congestion  Mouth/Throat:      Mouth: Mucous membranes are moist       Pharynx: Oropharynx is clear  No oropharyngeal exudate or posterior oropharyngeal erythema  Eyes:      General: No scleral icterus  Right eye: No discharge  Left eye: No discharge  Extraocular Movements: Extraocular movements intact  Conjunctiva/sclera: Conjunctivae normal       Pupils: Pupils are equal, round, and reactive to light  Cardiovascular:      Rate and Rhythm: Normal rate and regular rhythm  Pulses: Normal pulses  Heart sounds: Normal heart sounds  No murmur heard  No friction rub  No gallop  Pulmonary:      Effort: Respiratory distress present  Breath sounds: No stridor  Wheezing present  No rhonchi or rales  Comments: Increased work of breathing No accessory muscle use  Chest:      Chest wall: No tenderness  Abdominal:      General: Abdomen is flat  Bowel sounds are normal  There is no distension  Palpations: Abdomen is soft  There is no mass  Tenderness: There is no abdominal tenderness  There is no guarding or rebound  Musculoskeletal:         General: No swelling, tenderness, deformity or signs of injury  Normal range of motion  Cervical back: Normal range of motion and neck supple   No rigidity  No muscular tenderness  Skin:     General: Skin is warm  Capillary Refill: Capillary refill takes less than 2 seconds  Coloration: Skin is not jaundiced or pale  Findings: No bruising, erythema, lesion or rash  Neurological:      General: No focal deficit present  Mental Status: She is alert and oriented to person, place, and time  Mental status is at baseline  Cranial Nerves: No cranial nerve deficit  Sensory: No sensory deficit  Motor: No weakness  Coordination: Coordination normal    Psychiatric:         Mood and Affect: Mood normal          Behavior: Behavior normal          Thought Content: Thought content normal          Judgment: Judgment normal          Additional Data:     Lab Results: I have personally reviewed pertinent reports  Results from last 7 days   Lab Units 06/09/22  1317   WBC Thousand/uL 9 74   HEMOGLOBIN g/dL 16 1*   HEMATOCRIT % 49 0*   PLATELETS Thousands/uL 144*   NEUTROS PCT % 88*   LYMPHS PCT % 7*   MONOS PCT % 5   EOS PCT % 0     Results from last 7 days   Lab Units 06/09/22  1317   SODIUM mmol/L 141   POTASSIUM mmol/L 4 5   CHLORIDE mmol/L 105   CO2 mmol/L 31   BUN mg/dL 14   CREATININE mg/dL 0 64   ANION GAP mmol/L 5   CALCIUM mg/dL 8 3   ALBUMIN g/dL 2 6*   TOTAL BILIRUBIN mg/dL 1 10*   ALK PHOS U/L 49   ALT U/L 19   AST U/L 8   GLUCOSE RANDOM mg/dL 78                       Imaging: I have personally reviewed pertinent reports  XR chest 2 views   Final Result by Alia Stewart MD (06/09 1445)      1  No acute cardiopulmonary disease  2   Partially calcified nodule in the left upper lobe, described on multiple prior CTs and consistent with treated malignancy  This should be followed up per oncology protocol        I personally discussed this study with Bonita Francis on 6/9/2022 at 2:34 PM                      Workstation performed: [id number]                 Arav / Zebit Records Reviewed: Yes     ** Please Note: This note has been constructed using a voice recognition system   **

## 2022-06-09 NOTE — ASSESSMENT & PLAN NOTE
Patient Sees pulmonologist Alex Wong MD   at [facility] from 09/10/2021: FEV1 is severely reduced to 0 67 L 30% of predicted  Total lung capacity is 4 84 L 89% of predicted residual   volume is increased to 3 26 L 149% of predicted reflecting air trapping hyperinflation  Diffusion capacity is moderately reduced 57% of predicted  - Suggestive of emphysema pattern with severe obstruction  Patient is only on albuterol as needed and given that she is not compliant with  She does not use any maintenance inhaler  She mentions the cost is a problem given the fact that she does not have prescription insurance  Now she presents with worsening shortness of Breath coughing sputum production  She went to her pulmonologist office but she was deemed to be too sick to stay there and was recommended to come to the ED  Patient is on acute COPD exacerbation with acute hypoxemic respiratory failure  Plan: Will treat COPD exacerbation with nebulized bronchodilators - start Xopenex and ipratropium 3 times a day  Albuterol as needed in between  Start Solu-Medrol 40 mg IV q 8 hours and deescalate depending on clinical status  Will start azithromycin for now, check procalcitonin, is serial procalcitonin is negative will consider discontinuation  Provide oxygen supplementation to keep saturation around 92% no need for hypoxia  At the time of discharge patient will need long-term treatments, likely the best alternative use a nebulizer generic medicine (Pulmicort/Albuterol/Ipratropium) as she is not able to afford inhalers    She will need close follow-up with 1 office once cleared for discharge

## 2022-06-09 NOTE — ASSESSMENT & PLAN NOTE
Hx of bladder cancer with lung metastasis  Bladder cancer was treated several years ago with TURBT  Lung met has received RTx

## 2022-06-09 NOTE — ASSESSMENT & PLAN NOTE
Denies any chest pain currently  She has a history of CB as well as PVD      Continue carvedilol, clopidogrel, aspirin, statin

## 2022-06-09 NOTE — ASSESSMENT & PLAN NOTE
Patient with hypoxemia in the 80s at times  This is due to COPD exacerbation  Provide oxygen supplementation to keep saturation around 92% and wean as tolerated  Treat the exacerbation as above    She does have erythrocytosis which points to the fact that the hypoxia may be chronic    She will need to be tested for O2 requirements prior to discharge

## 2022-06-10 LAB
ALBUMIN SERPL BCP-MCNC: 2.3 G/DL (ref 3.5–5)
ALP SERPL-CCNC: 41 U/L (ref 46–116)
ALT SERPL W P-5'-P-CCNC: 15 U/L (ref 12–78)
ANION GAP SERPL CALCULATED.3IONS-SCNC: 5 MMOL/L (ref 4–13)
AST SERPL W P-5'-P-CCNC: 8 U/L (ref 5–45)
BASOPHILS # BLD AUTO: 0.01 THOUSANDS/ΜL (ref 0–0.1)
BASOPHILS NFR BLD AUTO: 0 % (ref 0–1)
BILIRUB SERPL-MCNC: 0.7 MG/DL (ref 0.2–1)
BUN SERPL-MCNC: 17 MG/DL (ref 5–25)
CALCIUM ALBUM COR SERPL-MCNC: 9.6 MG/DL (ref 8.3–10.1)
CALCIUM SERPL-MCNC: 8.2 MG/DL (ref 8.3–10.1)
CHLORIDE SERPL-SCNC: 107 MMOL/L (ref 100–108)
CO2 SERPL-SCNC: 32 MMOL/L (ref 21–32)
CREAT SERPL-MCNC: 0.69 MG/DL (ref 0.6–1.3)
EOSINOPHIL # BLD AUTO: 0 THOUSAND/ΜL (ref 0–0.61)
EOSINOPHIL NFR BLD AUTO: 0 % (ref 0–6)
ERYTHROCYTE [DISTWIDTH] IN BLOOD BY AUTOMATED COUNT: 14.1 % (ref 11.6–15.1)
GFR SERPL CREATININE-BSD FRML MDRD: 87 ML/MIN/1.73SQ M
GLUCOSE SERPL-MCNC: 142 MG/DL (ref 65–140)
HCT VFR BLD AUTO: 45.2 % (ref 34.8–46.1)
HGB BLD-MCNC: 14.6 G/DL (ref 11.5–15.4)
IMM GRANULOCYTES # BLD AUTO: 0.02 THOUSAND/UL (ref 0–0.2)
IMM GRANULOCYTES NFR BLD AUTO: 0 % (ref 0–2)
LYMPHOCYTES # BLD AUTO: 0.23 THOUSANDS/ΜL (ref 0.6–4.47)
LYMPHOCYTES NFR BLD AUTO: 3 % (ref 14–44)
MAGNESIUM SERPL-MCNC: 2.3 MG/DL (ref 1.6–2.6)
MCH RBC QN AUTO: 32.2 PG (ref 26.8–34.3)
MCHC RBC AUTO-ENTMCNC: 32.3 G/DL (ref 31.4–37.4)
MCV RBC AUTO: 100 FL (ref 82–98)
MONOCYTES # BLD AUTO: 0.04 THOUSAND/ΜL (ref 0.17–1.22)
MONOCYTES NFR BLD AUTO: 1 % (ref 4–12)
NEUTROPHILS # BLD AUTO: 6.62 THOUSANDS/ΜL (ref 1.85–7.62)
NEUTS SEG NFR BLD AUTO: 96 % (ref 43–75)
NRBC BLD AUTO-RTO: 0 /100 WBCS
PLATELET # BLD AUTO: 126 THOUSANDS/UL (ref 149–390)
PMV BLD AUTO: 9.1 FL (ref 8.9–12.7)
POTASSIUM SERPL-SCNC: 5.1 MMOL/L (ref 3.5–5.3)
PROT SERPL-MCNC: 5.6 G/DL (ref 6.4–8.2)
RBC # BLD AUTO: 4.54 MILLION/UL (ref 3.81–5.12)
SODIUM SERPL-SCNC: 144 MMOL/L (ref 136–145)
WBC # BLD AUTO: 6.92 THOUSAND/UL (ref 4.31–10.16)

## 2022-06-10 PROCEDURE — 97163 PT EVAL HIGH COMPLEX 45 MIN: CPT

## 2022-06-10 PROCEDURE — 97166 OT EVAL MOD COMPLEX 45 MIN: CPT

## 2022-06-10 PROCEDURE — 80053 COMPREHEN METABOLIC PANEL: CPT | Performed by: INTERNAL MEDICINE

## 2022-06-10 PROCEDURE — 99233 SBSQ HOSP IP/OBS HIGH 50: CPT | Performed by: INTERNAL MEDICINE

## 2022-06-10 PROCEDURE — 83735 ASSAY OF MAGNESIUM: CPT | Performed by: INTERNAL MEDICINE

## 2022-06-10 PROCEDURE — 85025 COMPLETE CBC W/AUTO DIFF WBC: CPT | Performed by: INTERNAL MEDICINE

## 2022-06-10 PROCEDURE — 94664 DEMO&/EVAL PT USE INHALER: CPT

## 2022-06-10 RX ORDER — METHYLPREDNISOLONE SODIUM SUCCINATE 40 MG/ML
40 INJECTION, POWDER, LYOPHILIZED, FOR SOLUTION INTRAMUSCULAR; INTRAVENOUS EVERY 12 HOURS SCHEDULED
Status: DISCONTINUED | OUTPATIENT
Start: 2022-06-10 | End: 2022-06-11 | Stop reason: HOSPADM

## 2022-06-10 RX ORDER — AZITHROMYCIN 500 MG/1
500 TABLET, FILM COATED ORAL EVERY 24 HOURS
Status: DISCONTINUED | OUTPATIENT
Start: 2022-06-10 | End: 2022-06-11 | Stop reason: HOSPADM

## 2022-06-10 RX ADMIN — ATORVASTATIN CALCIUM 40 MG: 40 TABLET, FILM COATED ORAL at 08:28

## 2022-06-10 RX ADMIN — DICLOFENAC SODIUM TOPICAL GEL, 1%, 2 G: 10 GEL TOPICAL at 08:30

## 2022-06-10 RX ADMIN — LOSARTAN POTASSIUM 25 MG: 25 TABLET, FILM COATED ORAL at 08:29

## 2022-06-10 RX ADMIN — DICLOFENAC SODIUM TOPICAL GEL, 1%, 2 G: 10 GEL TOPICAL at 13:04

## 2022-06-10 RX ADMIN — METHYLPREDNISOLONE SODIUM SUCCINATE 40 MG: 40 INJECTION, POWDER, FOR SOLUTION INTRAMUSCULAR; INTRAVENOUS at 05:14

## 2022-06-10 RX ADMIN — AZITHROMYCIN 500 MG: 500 TABLET, FILM COATED ORAL at 09:27

## 2022-06-10 RX ADMIN — METHYLPREDNISOLONE SODIUM SUCCINATE 40 MG: 40 INJECTION, POWDER, FOR SOLUTION INTRAMUSCULAR; INTRAVENOUS at 21:57

## 2022-06-10 RX ADMIN — METHYLPREDNISOLONE SODIUM SUCCINATE 40 MG: 40 INJECTION, POWDER, FOR SOLUTION INTRAMUSCULAR; INTRAVENOUS at 13:04

## 2022-06-10 RX ADMIN — CARVEDILOL 12.5 MG: 12.5 TABLET, FILM COATED ORAL at 08:29

## 2022-06-10 RX ADMIN — ASPIRIN 81 MG: 81 TABLET, COATED ORAL at 08:28

## 2022-06-10 RX ADMIN — DICLOFENAC SODIUM TOPICAL GEL, 1%, 2 G: 10 GEL TOPICAL at 17:13

## 2022-06-10 RX ADMIN — CARVEDILOL 12.5 MG: 12.5 TABLET, FILM COATED ORAL at 17:09

## 2022-06-10 RX ADMIN — CLOPIDOGREL BISULFATE 75 MG: 75 TABLET ORAL at 08:29

## 2022-06-10 NOTE — ASSESSMENT & PLAN NOTE
Patient Sees pulmonologist Tawana Mejia MD   at [facility]7 from 09/10/2021: FEV1 is severely reduced to 0 67 L 30% of predicted  Total lung capacity is 4 84 L 89% of predicted residual   volume is increased to 3 26 L 149% of predicted reflecting air trapping hyperinflation  Diffusion capacity is moderately reduced 57% of predicted  - Suggestive of emphysema pattern with severe obstruction  Patient is only on albuterol as needed and given that she is not compliant with  She does not use any maintenance inhaler  She mentions the cost is a problem given the fact that she does not have prescription insurance  Now she presents with worsening shortness of Breath coughing sputum production  She went to her pulmonologist office but she was deemed to be too sick to stay there and was recommended to come to the ED  Patient is on acute COPD exacerbation with acute hypoxemic respiratory failure  Plan:  Continue to treat COPD exacerbation with nebulized bronchodilators - Xopenex and ipratropium 3 times a day  Albuterol as needed in between  Continue Solu-medrol but de-escalate to BID  Continue azithromycin for now, check procalcitonin, is serial procalcitonin is negative will consider discontinuation  Provide oxygen supplementation to keep saturation around 92% no need for hypoxia  At the time of discharge patient will need long-term treatments, likely the best alternative use a nebulizer generic medicine (Pulmicort/Albuterol/Ipratropium) as she is not able to afford inhalers    She will need close follow-up with 1 office once cleared for discharge

## 2022-06-10 NOTE — CASE MANAGEMENT
Case Management Assessment & Discharge Planning Note    Patient name Ray Rivas  Location [address] [id number] MRN [id number]  : 1950 Date 6/10/2022       Current Admission Date: 2022  Current Admission Diagnosis:Acute respiratory failure with hypoxia [facility])   Patient Active Problem List    Diagnosis Date Noted    Acute respiratory failure with hypoxia ([facility] ) 2022    [facility] 20[facility]    History of cancer 2022    Coronary artery disease 2022    Chronic diastolic CH[facility]congestive heart failure) [facility] 2019    Essential hypertension 2004    Tobacco use disorder 2004      LOS (days): 1  Geometric Mean LOS (GMLOS) (days): 3 60  Days to GMLOS:2 7     OBJECTIVE:    Risk of Unplanned Readmission Score: 11 21         Current admission status: Inpatient       Preferred Pharmacy:   [facility] [address] CHASE Garrett [address] [place] [address] 59  N  Phone: [phone] Fax: [address] [place] [address] [address] [place] [address] 6  [address] [place] [id number]  Phone: [phone] Fax: [phone]    Primary Care Provider: Zuhair Elder DO    Primary Insurance: MEDICARE  Secondary Insurance: HU[employer]    ASSESSMENT:  [facility], [address] [place] [address] Representative - Spouse   Primary Phone: [phone] (Mobile)               Advance Directives  Does patient have a [address]?: No  Was patient offered paperwork?: Yes (Pt declined)  Does patient currently have a Health Care decision maker?: Yes, please see Health Care Proxy section (Pt reports she would like her , Ron Pereira, to assist with decision making if necessary )  Does patient have Advance Directives?: No  Was patient offered paperwork?: Yes (Pt declined)  Primary Contact: Jose Manuel Ethan/ ([phone])    Readmission Root Cause  30 Day Readmission: No    Patient Information  Admitted from[de-identified] Home  Mental Status: Alert  During Assessment patient was accompanied by: Not accompanied during assessment  Assessment information provided by[de-identified] Patient  Primary Caregiver: Self  Support Systems: Spouse/significant other  [place] of Residence: [place] [address] do you live in?: [address] entry access options  Select all that apply : Stairs  Number of steps to enter home  : 1  Do the steps have railings?: No  Type of Current Residence: Bi-level  Upon entering residence, is there a bedroom on the main floor (no further steps)?: No  A bedroom is located on the following floor levels of residence (select all that apply):: 2nd Floor  Upon entering residence, is there a bathroom on the main floor (no further steps)?: No  Indicate which floors of current residence have a bathroom (select all the apply):: 2nd Floor  Number of steps to 2nd floor from main floor: 6  In the last 12 months, was there a time when you were not able to pay the mortgage or rent on time?: No  In the last 12 months, how many places have you lived?: 1  In the last 12 months, was there a time when you did not have a steady place to sleep or slept in a shelter (including now)?: No  Homeless/housing insecurity resource given?: N/A  Living Arrangements: Lives w/ Spouse/significant other    Activities of Daily Living Prior to Admission  Functional Status: Independent  Completes ADLs independently?: Yes  Ambulates independently?: Yes  Does patient use assisted devices?: Yes  Assisted Devices (DME) used: Home Oxygen concentrator  DME Company Name (respiratory supplies): [employer]  O2 Rate(s): 2L as needed  Does patient currently own DME?: Yes  What DME does the patient currently own?: Sandie Ward, Home Oxygen concentrator  Does patient have a history of Outpatient Therapy (PT/OT)?: No  Does the patient have a history of Short-Term Rehab?: No  Does patient have a history of HHC?: No  Does patient currently have [address]?: No    Patient Information Continued  Income Source: Pension/penitentiary  Does patient have prescription coverage?: No  Within the past 12 months, you worried that your food would run out before you got the money to buy more : Never true  Within the past 12 months, the food you bought just didn't last and you didn't have money to get more : Never true  Food insecurity resource given?: N/A  Does patient receive dialysis treatments?: No  Does patient have a history of substance abuse?: No  Does patient have a history of Mental Health Diagnosis?: No    Means of Transportation  Means of Transport to Appts[de-identified] Family transport  In the past 12 months, has lack of transportation kept you from medical appointments or from getting medications?: No  In the past 12 months, has lack of transportation kept you from meetings, work, or from getting things needed for daily living?: No  Was application for public transport provided?: N/A    DISCHARGE DETAILS:    Discharge planning discussed with[de-identified] Patient  Freedom of Choice: Yes  Comments - Freedom of Choice: Discussed freedom of choice as it relates to discharge planning based on treatment team recommendations  Pt declines discharge needs at this time  CM contacted family/caregiver?: No- see comments (Pt declined, reports she will call family herself)  Were Treatment Team discharge recommendations reviewed with patient/caregiver?: Yes  Did patient/caregiver verbalize understanding of patient care needs?: Yes  Were patient/caregiver advised of the risks associated with not following Treatment Team discharge recommendations?: Yes    Contacts  Patient Contacts: Patient  Contact Method:  In Person  Reason/Outcome: Continuity of Care, Emergency Contact, Discharge [address]         Is the patient interested in [place] [address] at discharge?: No    DME Referral Provided  Referral made for DME?: No    Other Referral/Resources/Interventions Provided:  Interventions: None Indicated    Treatment Team Recommendation: Home  Discharge Destination Plan[de-identified] Home  Transport at Discharge : Family     Additional Comments: Pt reports being vaccinated for Covid including booster

## 2022-06-10 NOTE — OCCUPATIONAL THERAPY NOTE
Occupational Therapy Evaluation        Patient Name: Ray PEREZ Date: 6/10/2022       06/10/22 0911   OT Last Visit   OT Visit Date 06/10/22   Note Type   Note type Evaluation   Restrictions/Precautions   Weight Bearing Precautions Per Order No   Braces or Orthoses Other (Comment)  (none per patient)   Other Precautions Chair Alarm; Fall Risk;O2  (3 L of O2 via NC)   Pain Assessment   Pain Assessment Tool 0-10   Pain Score No Pain   Home Living   Type of Home House  (bi-level)   Home Layout Two level  (1 CHARLES, 5-6 to bed/bath)   Bathroom Shower/Tub Tub/shower unit   Bathroom Toilet Standard   Bathroom Equipment Other (Comment)  (none per patient)   [address] Other (Comment)  (home O2 as needed but not used overnight)   Additional Comments ambulatory with no AD   Prior Function   Level of [place] Independent with ADLs and functional mobility; Needs assistance with IADLs   Lives With Spouse   Receives Help From Family   ADL Assistance Independent   IADLs Needs assistance  (shares responsibilities with spouse)   Falls in the last 6 months 0   Vocational Retired  ()   Comments  (+)   Lifestyle   Autonomy Patient reported independent with ADLs and functional mobility but required assistance for IADLs  Patient lives with spouse in a bi-level home with 1 step to enter and 5 to 6 steps up to second level where bed/bath are located  Patient ambulates without the use of assistive devices and reports no falls within the last 6 months  Patient is a retired  who enjoys crochetching, reading, and gardening     Reciprocal Relationships supportive spouse   Intrinsic Gratification crocheting, reading, gardening   Psychosocial   Psychosocial (WDL) WDL   Subjective   Subjective "I'm good"   ADL   Eating Assistance 6  Modified independent   Grooming Assistance 6  Modified Independent   UB Bathing Assistance 6  Modified Independent   LB Bathing Assistance 5  Supervision/Setup   UB Dressing Assistance 6  Modified independent   LB Dressing Assistance 5  Supervision/Setup   Toileting Assistance  5  Supervision/Setup   Functional Assistance 5  Supervision/Setup   Bed Mobility   Additional Comments elizabeth recieved seated at [place] to Stand 5  Supervision   Additional items Assist x 1;Verbal cues   Stand to Sit 5  Supervision   Additional items Assist x 1;Verbal cues   Functional Mobility   Functional Mobility 5  Supervision   Additional Comments During ambulation patient's O2 observed to drop to 84, patient seated and O2 levels increased, nurse made aware   Additional items   (no AD utilized)   Balance   Static Sitting Good   Dynamic Sitting Fair +   Static Standing Fair   Dynamic Standing Fair -   Ambulatory Fair -   Activity Tolerance   Activity Tolerance Patient limited by fatigue   Medical Staff Made Aware  [address]   Nurse Made Aware RN on duty   RUE Assessment   RUE Assessment WNL   RUE Strength   RUE Overall Strength Within Functional Limits - able to perform ADL tasks with strength  (4/5)   LUE Assessment   LUE Assessment WNL   LUE Strength   LUE Overall Strength Within Functional Limits - able to perform ADL tasks with strength  (4/5)   Hand Function   Gross Motor Coordination Impaired   Fine Motor Coordination Functional   Sensation   Light Touch No apparent deficits  (BUEs)   Proprioception   Proprioception No apparent deficits  (BUEs)   Vision-Basic Assessment   Current Vision Wears glasses only for reading   Patient Visual Report Other (Comment)  (no changes noted)   Cognition   Overall Cognitive Status WFL   Arousal/Participation Alert; Responsive; Cooperative   Attention Within functional limits   Orientation Level Oriented X4   Memory Within functional limits   Following Commands Follows all commands and directions without difficulty   Assessment   Limitation Decreased ADL status; Decreased endurance;Decreased self-care trans;Decreased high-level ADLs   Prognosis Good   Assessment Patient is a 66 yr  old female who was seen for an OT evaluation s/p admission to [place] [id number] on 6/9/22 due to shortness of breath and cough  Patient was diagnosed with acute respiratory failure with hypoxia  Other contributing factors affecting the patients occupational performance at this time include COPD exacerbation, coronary artery disease, history of cancer, tobacco use disorder, essential hypertension, and chronic diastolic congestive heart failure  Orders were placed for OT evaluation and treatment  At least two patient identifiers performed during session including name and wristband  Prior to admission patient reported independent with ADLs and functional mobility but required assistance for IADLs  Patient lives with spouse in a bi-level home with 1 step to enter and 5 to 6 steps up to second level where bed/bath are located  Patient ambulates without the use of assistive devices and reports no falls within the last 6 months  Patient is a retired  who enjoys [employer] Artisting, reading, and gardening  Personal factors that are inhibiting the patients performance at time of evaluation include: steps to enter, difficulty performing ADLs, and difficulty performing IADLs  Upon evaluation patient requires supervision for functional mobility, Min A for LB ADLs, and Mod I for UB ADLs  Patients occupational performance is affected by the following deficits: impaired gross motor coordination, decreased static/dynamic sitting balance, decreased static/dynamic standing balance, decreased activity tolerance and decreased endurance  Patient to benefit from continued Occupational Therapy treatment while in the hospital to address the stated deficits and improve occupational performance and overall quality of life  From an OT standpoint at this time, d/c recommendation would be home with outpatient services     Plan   Treatment Interventions ADL retraining; Endurance training;Patient/family training;Equipment evaluation/education; Compensatory technique education;Continued evaluation; Energy conservation   Goal Expiration Date 06/24/22   OT Frequency 2-3x/wk   Recommendation   OT Discharge Recommendation Home with outpatient rehabilitation   AM-PAC Daily Activity Inpatient   Lower Body Dressing 3   Bathing 3   Toileting 3   Upper Body Dressing 4   Grooming 4   Eating 4   Daily Activity Raw Score 21   Daily Activity Standardized Score (Calc for Raw Score >=11) 44 27   AM-PAC Applied Cognition Inpatient   Following a Speech/Presentation 4   Understanding Ordinary Conversation 4   Taking Medications 4   Remembering Where Things Are Placed or Put Away 4   Remembering List of 4-5 Errands 4   Taking Care of Complicated Tasks 4   Applied Cognition Raw Score 24   Applied Cognition Standardized Score 62 21   Barthel Index   Feeding 10   Bathing 0   Grooming Score 5   Dressing Score 5   Bladder Score 10   Bowels Score 10   Toilet Use Score 5   Transfers (Bed/Chair) Score 10   Mobility (Level Surface) Score 0   Stairs Score 0   Barthel Index Score 55     Patient will complete toileting hygiene with Mod I  Patient will verbalize and demonstrate the use of deep breathing/energy conservation techniques during functional activities with no verbal cues  Patient will improve standing tolerance to 10-15 min to increase activity tolerance during ADL/IADL tasks  Patient will complete LB bathing while seated in shower with Mod I  Patient will complete LB dressing tasks while seated EOB with Mod I  Patient will complete transfers from bed to chair/toilet with Mod I  Patient will demonstrate OOB/ sitting tolerance to 2-4 hours per day for increased participation in self care and leisure tasks with no exertion  Patient will demonstrate fair + dynamic/static standing balance for increased participation in ADL/IADL activities

## 2022-06-10 NOTE — PROGRESS NOTES
[facility]  Progress Note Michelle Cain 1950, 66 y o  female MRN: [id number]  Unit/Bed#: -01 Encounter: [id number]  Primary Care Provider: Verito Luna DO   Date and time admitted to hospital: 6/9/2022 12:34 PM    * Acute respiratory failure with hypoxia [facility])  Assessment & Plan  Patient with hypoxemia in the 80s at times this has been both witnessed and documented  Also with increased worth of breathing and tachypnea  She does have acute hypoxemic respiratory failure  She does have erythrocytosis which points to the fact that the hypoxia may be acute on chronic as well (definetely there is an acute component given her presentation)  She will need to be tested for O2 requirements prior to discharge  This is due to COPD exacerbation  Provide oxygen supplementation to keep saturation around 92% and wean as tolerated  Treat the exacerbation as above        COPD exacerbation [facility])  Assessment & Plan  Patient Sees pulmonologist Zoraida Willoughby MD   at [place] [id number] from 09/10/2021: FEV1 is severely reduced to 0 67 L 30% of predicted  Total lung capacity is 4 84 L 89% of predicted residual   volume is increased to 3 26 L 149% of predicted reflecting air trapping hyperinflation  Diffusion capacity is moderately reduced 57% of predicted  - Suggestive of emphysema pattern with severe obstruction  Patient is only on albuterol as needed and given that she is not compliant with  She does not use any maintenance inhaler  She mentions the cost is a problem given the fact that she does not have prescription insurance  Now she presents with worsening shortness of Breath coughing sputum production  She went to her pulmonologist office but she was deemed to be too sick to stay there and was recommended to come to the ED  Patient is on acute COPD exacerbation with acute hypoxemic respiratory failure      Plan:  Continue to treat COPD exacerbation with nebulized bronchodilators - Xopenex and ipratropium 3 times a day  Albuterol as needed in between  Continue Solu-medrol but de-escalate to BID  Continue azithromycin for now, check procalcitonin, is serial procalcitonin is negative will consider discontinuation  Provide oxygen supplementation to keep saturation around 92% no need for hypoxia  At the time of discharge patient will need long-term treatments, likely the best alternative use a nebulizer generic medicine (Pulmicort/Albuterol/Ipratropium) as she is not able to afford inhalers  She will need close follow-up with 1 office once cleared for discharge      Chronic diastolic CHF (congestive heart failure) ([facility] [address] )  Assessment & Plan  Mentioned In chart however currently stable, does not appear in any volume overload        VTE Pharmacologic Prophylaxis:   Pharmacologic: Enoxaparin (Lovenox)  Mechanical VTE Prophylaxis in Place: Yes    Patient Centered Rounds: I have performed bedside rounds with nursing staff today  Discussions with Specialists or Other Care Team Provider:  Discussed with care management team    Education and Discussions with Family / Patient:  Patient    Time Spent for Care: 45 minutes  More than 50% of total time spent on counseling and coordination of care as described above  Current Length of Stay: 1 day(s)    Current Patient Status: Inpatient   Certification Statement: The patient will continue to require additional inpatient hospital stay due to Need for IV therapy    Discharge Plan:  Once stable    Code Status: Level 1 - Full Code      Subjective:     Patient evaluated this morning  She states that her breathing is slightly better although not completely back to baseline yet  She otherwise denies nausea vomiting, diarrhea constipation    No other events reported    Objective:     Vitals:   Temp (24hrs), Av 3 °F (36 8 °C), Min:98 1 °F (36 7 °C), Max:98 7 °F (37 1 °C)    Temp:  [98 1 °F (36 7 °C)-98 7 °F (37 1 °C)] 98 1 °F (36 7 °C)  HR:  [70-88] 77  Resp:  [15-24] 17  BP: ()/(54-88) 149/72  SpO2:  [83 %-97 %] 89 %  Body mass index is 26 68 kg/m²  Input and Output Summary (last 24 hours): Intake/Output Summary (Last 24 hours) at 6/10/2022 1323  Last data filed at 6/9/2022 2108  Gross per 24 hour   Intake 1240 ml   Output --   Net 1240 ml       Physical Exam:     Physical Exam  Vitals and nursing note reviewed  Constitutional:       Appearance: Normal appearance  She is normal weight  Comments: Female in bed, awake   HENT:      Head: Normocephalic and atraumatic  Right Ear: External ear normal       Left Ear: External ear normal       Nose: Nose normal  No congestion  Mouth/Throat:      Mouth: Mucous membranes are moist       Pharynx: Oropharynx is clear  No oropharyngeal exudate or posterior oropharyngeal erythema  Eyes:      General: No scleral icterus  Right eye: No discharge  Left eye: No discharge  Extraocular Movements: Extraocular movements intact  Conjunctiva/sclera: Conjunctivae normal       Pupils: Pupils are equal, round, and reactive to light  Cardiovascular:      Rate and Rhythm: Normal rate and regular rhythm  Pulses: Normal pulses  Heart sounds: Normal heart sounds  No murmur heard  No friction rub  No gallop  Pulmonary:      Effort: Pulmonary effort is normal  No respiratory distress  Breath sounds: Normal breath sounds  No stridor  No wheezing, rhonchi or rales  Comments: On oxygen cannula, increased work of breathing but no accessory muscle use, mild late expiratory wheezing appreciated  Chest:      Chest wall: No tenderness  Abdominal:      General: Abdomen is flat  Bowel sounds are normal  There is no distension  Palpations: Abdomen is soft  There is no mass  Tenderness: There is no abdominal tenderness  There is no guarding or rebound     Musculoskeletal:         General: No swelling, tenderness, deformity or signs of injury  Normal range of motion  Cervical back: Normal range of motion and neck supple  No rigidity  No muscular tenderness  Skin:     General: Skin is warm and dry  Capillary Refill: Capillary refill takes less than 2 seconds  Coloration: Skin is not jaundiced or pale  Findings: No bruising, erythema, lesion or rash  Neurological:      General: No focal deficit present  Mental Status: She is alert and oriented to person, place, and time  Mental status is at baseline  Cranial Nerves: No cranial nerve deficit  Sensory: No sensory deficit  Motor: No weakness  Coordination: Coordination normal    Psychiatric:         Mood and Affect: Mood normal          Behavior: Behavior normal          Thought Content: Thought content normal          Judgment: Judgment normal            Additional Data:     Labs:    Results from last 7 days   Lab Units 06/10/22  0545   WBC Thousand/uL 6 92   HEMOGLOBIN g/dL 14 6   HEMATOCRIT % 45 2   PLATELETS Thousands/uL 126*   NEUTROS PCT % 96*   LYMPHS PCT % 3*   MONOS PCT % 1*   EOS PCT % 0     Results from last 7 days   Lab Units 06/10/22  0545   SODIUM mmol/L 144   POTASSIUM mmol/L 5 1   CHLORIDE mmol/L 107   CO2 mmol/L 32   BUN mg/dL 17   CREATININE mg/dL 0 69   ANION GAP mmol/L 5   CALCIUM mg/dL 8 2*   ALBUMIN g/dL 2 3*   TOTAL BILIRUBIN mg/dL 0 70   ALK PHOS U/L 41*   ALT U/L 15   AST U/L 8   GLUCOSE RANDOM mg/dL 142*                 Results from last 7 days   Lab Units 06/09/22  1741   PROCALCITONIN ng/ml 0 05           * I Have Reviewed All Lab Data Listed Above  * Additional Pertinent Lab Tests Reviewed:  [facility]      Recent Cultures (last 7 days):           Last 24 Hours Medication List:   Current Facility-Administered Medications   Medication Dose Route Frequency Provider Last Rate    acetaminophen  650 mg Oral Q6H PRN Isi Beltre MD      albuterol  2 puff Inhalation Q6H PRN Ovidio Davis Haja Schaefer MD      aspirin  81 mg Oral Daily Katelynn Avila MD      atorvastatin  40 mg Oral Daily Katelynn Avila MD      azithromycin  500 mg Oral Q24H Katelynn Avila MD      carvedilol  12 5 mg Oral BID With Meals Katelynn Avila MD      clopidogrel  75 mg Oral Daily Katelynn Avila MD      Diclofenac Sodium  2 g Topical 4x Daily Katelynn Avila MD      enoxaparin  40 mg Subcutaneous Daily Katelynn Avila MD      losartan  25 mg Oral Daily Katelynn Avila MD      methylPREDNISolone sodium succinate  40 mg Intravenous Q12H [address] JOHN Mcarthur MD      nicotine  1 patch Transdermal Daily Katelynn Avila MD      ondansetron  4 mg Intravenous Q6H PRN Katelynn Avila MD          Today, Patient Was Seen By: Katelynn Avila MD    ** Please Note: Dictation voice to text software may have been used in the creation of this document   **

## 2022-06-10 NOTE — PLAN OF CARE
Problem: PHYSICAL THERAPY ADULT  Goal: Performs mobility at highest level of function for planned discharge setting  See evaluation for individualized goals  Description: Treatment/Interventions: LE strengthening/ROM, Therapeutic exercise, Endurance training, Functional transfer training, Patient/family training, Bed mobility, Gait training, Spoke to nursing, OT          See flowsheet documentation for full assessment, interventions and recommendations  Outcome: Progressing  Note: Prognosis: Good  Problem List: Decreased strength, Decreased endurance, Impaired balance, Decreased mobility  Assessment: Pt is 66 y o  female seen for PT evaluation s/p admit to [facility] on 6/9/2022 w/ Acute respiratory failure with hypoxia ([facility] [address] )  PT consulted to assess pt's functional mobility and d/c needs  Order placed for PT eval and tx, w/ up w/ A order  Comorbidities affecting pt's physical performance at time of assessment include: acute respiratory failure, COPD, CAD, history of CA, HTN, CHF  PTA, pt was independent w/ all functional mobility w/ no assistive device, ambulates community distances and elevations, ambulates household distances, has 1 CHARLES, lives w/ spouse in 2 level hous and retired  Personal factors affecting pt at time of IE include: stairs to enter home, inability to navigate community distances, unable to perform dynamic tasks in community and inability to perform IADLs  Please find objective findings from PT assessment regarding body systems outlined above with impairments and limitations including weakness, impaired balance, decreased endurance, decreased activity tolerance, decreased functional mobility tolerance, fall risk and SOB upon exertion  The following objective measures performed on IE also reveal limitations: Barthel Index: 55/100, Modified [place]: 3 (moderate disability) and AM-PAC 6-Clicks: 17/71   Pt's clinical presentation is currently unstable/unpredictable seen in pt's presentation of continued need for medical management and monitoring, decreased strength and balance resulting in an increased risk for falls, decreased activity tolerance and endurance limiting overall mobility  Pt to benefit from continued PT tx to address deficits as defined above and maximize level of functional independent mobility and consistency  From PT/mobility standpoint, recommendation at time of d/c would be home with outpatient rehabilitation pending progress in order to facilitate return to PLOF  Barriers to Discharge: Inaccessible home environment        PT Discharge Recommendation: Home with outpatient rehabilitation          See flowsheet documentation for full assessment

## 2022-06-10 NOTE — ASSESSMENT & PLAN NOTE
Patient with hypoxemia in the 80s at times this has been both witnessed and documented  Also with increased worth of breathing and tachypnea  She does have acute hypoxemic respiratory failure  She does have erythrocytosis which points to the fact that the hypoxia may be acute on chronic as well (definetely there is an acute component given her presentation)  She will need to be tested for O2 requirements prior to discharge  This is due to COPD exacerbation    Provide oxygen supplementation to keep saturation around 92% and wean as tolerated  Treat the exacerbation as above

## 2022-06-10 NOTE — PHYSICAL THERAPY NOTE
Physical Therapy Evaluation     Patient's Name: Bright Hendrickson    Admitting Diagnosis  SOB (shortness of breath) [R06 02]  Hypoxia [R09 02]  COPD with acute exacerbation ([place] [address] ) [J44 1]    Problem List  Patient Active Problem List   Diagnosis    Chronic diastolic CHF (congestive heart failure) ([place] [address] )    Essential hypertension    Tobacco use disorder    Acute respiratory failure with hypoxia (HCC)    COPD exacerbation ([place] [address] )    History of cancer    Coronary artery disease       Past Medical History  Past Medical History:   Diagnosis Date    Cancer ([place] [address] )     bladder    COPD (chronic obstructive pulmonary disease) ([place] [address] )     Hyperlipidemia     Hypertension     Myocardial infarction ([place] [address] )     2010       Past Surgical History  Past Surgical History:   Procedure Laterality Date    BLADDER SURGERY      CORONARY ARTERY BYPASS GRAFT      DENTAL SURGERY      HYSTERECTOMY      NV ESOPHAGOGASTRODUODENOSCOPY TRANSORAL DIAGNOSTIC N/A 4/17/2017    Procedure: ESOPHAGOGASTRODUODENOSCOPY (EGD); Surgeon: Deniz Bates MD;  Location: [facility]; Service: Gastroenterology        06/10/22 0927   PT Last Visit   PT Visit Date 06/10/22   Note Type   Note type Evaluation   Pain Assessment   Pain Assessment Tool 0-10   Pain Score No Pain   Restrictions/Precautions   Weight Bearing Precautions Per Order No   Braces or Orthoses Other (Comment)  (none per patient)   Other Precautions Chair Alarm; Fall Risk;O2  (5 O2 via NC)   Home Living   Type of [address] [place] [address] Two level  (1 CHARLES, 5-6 to bed/bath)   Bathroom Shower/Tub Tub/shower unit   Bathroom Toilet Standard   Bathroom Equipment Other (Comment)  (none per patient)   [address] Other (Comment)  (home O2 used overnight)   Additional Comments ambulatory with no AD   Prior Function   Level of [place] Independent with ADLs and functional mobility; Needs assistance with IADLs   Lives With Spouse   Receives Help From Family   ADL Assistance Independent   IADLs Needs assistance  (shares responsibilities with spouse)   Falls in the last 6 months 0   Vocational Retired  ()   Comments (+) drive   General   Family/Caregiver Present No   Cognition   Overall Cognitive Status WFL   Attention Within functional limits   Orientation Level Oriented X4   Memory Within functional limits   Following Commands Follows all commands and directions without difficulty   Comments Pt agreeable to participate in PT evaluation   RUE Assessment   RUE Assessment WNL   RUE Strength   RUE Overall Strength Within Functional Limits - able to perform ADL tasks with strength  (4/5)   LUE Assessment   LUE Assessment WNL   LUE Strength   LUE Overall Strength Within Functional Limits - able to perform ADL tasks with strength  (4/5)   RLE Assessment   RLE Assessment X   Strength RLE   RLE Overall Strength 4/5   LLE Assessment   LLE Assessment X   Strength LLE   LLE Overall Strength 4/5   Light Touch   RLE Light Touch Grossly intact   LLE Light Touch Grossly intact   Bed Mobility   Additional Comments Pt received at start of session seated at EOB   Transfers   Sit to Stand 5  Supervision   Additional items Assist x 1; Armrests; Increased time required;Verbal cues   Stand to Sit 5  Supervision   Additional items Assist x 1; Armrests; Increased time required   Additional Comments without device   Ambulation/Elevation   Gait pattern Short stride; Excessively slow; Inconsistent michael   Gait Assistance 5  Supervision   Additional items Assist x 1;Verbal cues   Assistive Device None   Distance 80'   Stair Management Assistance Not tested   Balance   Static Sitting Good   Dynamic Sitting Fair +   Static Standing Fair   Dynamic Standing Fair -   Ambulatory Fair -   Endurance Deficit   Endurance Deficit Yes   Endurance Deficit Description decreased activity tolerance, decreased SpO2 with activity to 84% on 5L O2   Activity Tolerance   Activity Tolerance Patient limited by fatigue   Medical Staff Made Aware OT [place]ob [place] [address]   Nurse Made Aware RN confirmed pt appropriate for PT treatment session   Assessment   Prognosis Good   Problem List Decreased strength;Decreased endurance; Impaired balance;Decreased mobility   Assessment Pt is 66 y o  female seen for PT evaluation s/p admit to [facility] on 6/9/2022 w/ Acute respiratory failure with hypoxia ([address] )  PT consulted to assess pt's functional mobility and d/c needs  Order placed for PT eval and tx, w/ up w/ A order  Comorbidities affecting pt's physical performance at time of assessment include: acute respiratory failure, COPD, CAD, history of CA, HTN, CHF  PTA, pt was independent w/ all functional mobility w/ no assistive device, ambulates community distances and elevations, ambulates household distances, has 1 CHARLES, lives w/ spouse in 2 level hous and retired  Personal factors affecting pt at time of IE include: stairs to enter home, inability to navigate community distances, unable to perform dynamic tasks in community and inability to perform IADLs  Please find objective findings from PT assessment regarding body systems outlined above with impairments and limitations including weakness, impaired balance, decreased endurance, decreased activity tolerance, decreased functional mobility tolerance, fall risk and SOB upon exertion  The following objective measures performed on IE also reveal limitations: Barthel Index: 55/100, Modified [place]: 3 (moderate disability) and AM-PAC 6-Clicks: 14/41  Pt's clinical presentation is currently unstable/unpredictable seen in pt's presentation of continued need for medical management and monitoring, decreased strength and balance resulting in an increased risk for falls, decreased activity tolerance and endurance limiting overall mobility  Pt to benefit from continued PT tx to address deficits as defined above and maximize level of functional independent mobility and consistency   From PT/mobility standpoint, recommendation at time of d/c would be home with outpatient rehabilitation pending progress in order to facilitate return to PLOF  Barriers to Discharge Inaccessible home environment   Goals   Patient Goals None stated   STG Expiration Date 06/20/22   Short Term Goal #1 In 7-10 days: Perform all bed mobility tasks modified independent to decrease caregiver burden, Perform all transfers modified independent to improve independence, Ambulate > 150 ft  with least restrictive assistive device modified independent w/o LOB and w/ normalized gait pattern 100% of the time and PT to see and establish goals for stair negotiation when appropriate   Plan   Treatment/Interventions LE strengthening/ROM; Therapeutic exercise; Endurance training;Functional transfer training;Patient/family training;Bed mobility;Gait training;Spoke to nursing;OT   PT Frequency 3-5x/wk   Recommendation   PT Discharge Recommendation Home with outpatient rehabilitation   AM-PAC Basic Mobility Inpatient   Turning in Bed Without Bedrails 3   Lying on Back to Sitting on Edge of Flat Bed 3   Moving Bed to Chair 3   Standing Up From Chair 3   Walk in Room 3   Climb 3-5 Stairs 2   Basic Mobility Inpatient Raw Score 17   Basic Mobility Standardized Score 39 67   Highest Level Of Mobility   -HL Goal 5: Stand one or more mins   -HLM Achieved 6: Walk 10 steps or more   Modified [place] Scale   Modified [place]arc[place] Scale 3   Barthel Index   Feeding 10   Bathing 0   Grooming Score 5   Dressing Score 5   Bladder Score 10   Bowels Score 10   Toilet Use Score 5   Transfers (Bed/Chair) Score 10   Mobility (Level Surface) Score 0   Stairs Score 0   Barthel Index Score 55       Patrizia Pierre, PT

## 2022-06-10 NOTE — PLAN OF CARE
Problem: OCCUPATIONAL THERAPY ADULT  Goal: Performs self-care activities at highest level of function for planned discharge setting  See evaluation for individualized goals  Description: Treatment Interventions: ADL retraining, Endurance training, Patient/family training, Equipment evaluation/education, Compensatory technique education, Continued evaluation, Energy conservation        See flowsheet documentation for full assessment, interventions and recommendations  Note: Limitation: Decreased ADL status, Decreased endurance, Decreased self-care trans, Decreased high-level ADLs  Prognosis: Good  Assessment: Patient is a 66 yr  old female who was seen for an OT evaluation s/p admission to [place] [address] on 6/9/22 due to shortness of breath and cough  Patient was diagnosed with acute respiratory failure with hypoxia  Other contributing factors affecting the patients occupational performance at this time include COPD exacerbation, coronary artery disease, history of cancer, tobacco use disorder, essential hypertension, and chronic diastolic congestive heart failure  Orders were placed for OT evaluation and treatment  At least two patient identifiers performed during session including name and wristband  Prior to admission patient reported independent with ADLs and functional mobility but required assistance for IADLs  Patient lives with spouse in a bi-level home with 1 step to enter and 5 to 6 steps up to second level where bed/bath are located  Patient ambulates without the use of assistive devices and reports no falls within the last 6 months  Patient is a retired  who enjoys crochetching, reading, and gardening  Personal factors that are inhibiting the patients performance at time of evaluation include: steps to enter, difficulty performing ADLs, and difficulty performing IADLs   Upon evaluation patient requires supervision for functional mobility, Min A for LB ADLs, and Mod I for UB ADLs  Patients occupational performance is affected by the following deficits: impaired gross motor coordination, decreased static/dynamic sitting balance, decreased static/dynamic standing balance, decreased activity tolerance and decreased endurance  Patient to benefit from continued Occupational Therapy treatment while in the hospital to address the stated deficits and improve occupational performance and overall quality of life  From an OT standpoint at this time, d/c recommendation would be home with outpatient services       OT Discharge Recommendation: Home with outpatient rehabilitation

## 2022-06-10 NOTE — PROGRESS NOTES
The azithromycin has / have been converted to Oral per [facility] IV-to-PO Auto-Conversion Protocol for Adults as approved by the Pharmacy and Therapeutics Committee  The patient met all eligible criteria:  3 Age = 25years old   2) Received at least one dose of the IV form   3) Receiving at least one other scheduled oral/enteral medication   4) Tolerating an oral/enteral diet   and did not have any exclusions:   1) Critical care patient   2) Active GI bleed (IF assessing H2RAs or PPIs)   3) Continuous tube feeding (IF assessing cipro, doxycycline, levofloxacin, minocycline, rifampin, or voriconazole)   4) Receiving PO vancomycin (IF assessing metronidazole)   5) Persistent nausea and/or vomiting   6) Ileus or gastrointestinal obstruction   7) Josselyn/nasogastric tube set for continuous suction   8) Specific order not to automatically convert to PO (in the order's comments or if discussed in the most recent Infectious Disease or primary team's progress notes)

## 2022-06-10 NOTE — RESPIRATORY THERAPY NOTE
RT Protocol Note  Read Kevin 66 y o  female MRN: [id number]  Unit/Bed#: -01 Encounter: [id number]    Assessment    Principal Problem:    Acute respiratory failure with hypoxia ([place] [address] )  Active Problems:    Chronic diastolic CHF (congestive heart failure) ([place] [address] )    Essential hypertension    Tobacco use disorder    COPD exacerbation ([place] [address] )    History of cancer    Coronary artery disease      Home Pulmonary Medications:  Albuterol MDI PRN       Past Medical History:   Diagnosis Date    Cancer ([place] [address] )     bladder    COPD (chronic obstructive pulmonary disease) ([place] [address] )     Hyperlipidemia     Hypertension     Myocardial infarction ([place] [address] )     2010     Social History     Socioeconomic History    Marital status: /Civil Union     Spouse name: None    Number of children: None    Years of education: None    Highest education level: None   Occupational History    None   Tobacco Use    Smoking status: Current Every Day Smoker     Packs/day: 0 50     Types: Cigarettes    Smokeless tobacco: Never Used   Vaping Use    Vaping Use: Never used   Substance and Sexual Activity    Alcohol use: Yes     Comment: socailly    Drug use: No    Sexual activity: None   Other Topics Concern    None   Social History Narrative    None     Social Determinants of Health     Financial Resource Strain: Not on file   Food Insecurity: Not on file   Transportation Needs: Not on file   Physical Activity: Not on file   Stress: Not on file   Social Connections: Not on file   Intimate Partner Violence: Not on file   Housing Stability: Not on file       Subjective         Objective    Physical Exam:   Assessment Type: Assess only  General Appearance: Awake  Respiratory Pattern: Dyspnea with exertion  Chest Assessment: Chest expansion symmetrical  Bilateral Breath Sounds: Diminished  O2 Device: 5LNC    Vitals:  Blood pressure 111/62, pulse 70, temperature 98 2 °F (36 8 °C), resp  rate 19, height 5' 8" (1 727 m), SpO2 90 %  Imaging and other studies: I have personally reviewed pertinent reports  O2 Device: 5LNC     Plan    Respiratory Plan: Home Bronchodilator Patient pathway        Resp Comments: patient with hisotry of COPD and lung mets  Patient is resting comfortably at this time  Breath sounds at this time do not indicate a need for scheduled txs  Patient states she takes PRN MDI at home and uses 2L O2  will continue with home meds

## 2022-06-11 VITALS
SYSTOLIC BLOOD PRESSURE: 137 MMHG | RESPIRATION RATE: 18 BRPM | WEIGHT: 175.49 LBS | DIASTOLIC BLOOD PRESSURE: 71 MMHG | HEIGHT: 68 IN | BODY MASS INDEX: 26.6 KG/M2 | OXYGEN SATURATION: 90 % | TEMPERATURE: 98.1 F | HEART RATE: 74 BPM

## 2022-06-11 LAB
ANION GAP SERPL CALCULATED.3IONS-SCNC: 3 MMOL/L (ref 4–13)
BASOPHILS # BLD AUTO: 0 THOUSANDS/ΜL (ref 0–0.1)
BASOPHILS NFR BLD AUTO: 0 % (ref 0–1)
BUN SERPL-MCNC: 21 MG/DL (ref 5–25)
CALCIUM SERPL-MCNC: 8.6 MG/DL (ref 8.3–10.1)
CHLORIDE SERPL-SCNC: 108 MMOL/L (ref 100–108)
CO2 SERPL-SCNC: 33 MMOL/L (ref 21–32)
CREAT SERPL-MCNC: 0.7 MG/DL (ref 0.6–1.3)
EOSINOPHIL # BLD AUTO: 0 THOUSAND/ΜL (ref 0–0.61)
EOSINOPHIL NFR BLD AUTO: 0 % (ref 0–6)
ERYTHROCYTE [DISTWIDTH] IN BLOOD BY AUTOMATED COUNT: 14.1 % (ref 11.6–15.1)
GFR SERPL CREATININE-BSD FRML MDRD: 86 ML/MIN/1.73SQ M
GLUCOSE SERPL-MCNC: 150 MG/DL (ref 65–140)
HCT VFR BLD AUTO: 44.8 % (ref 34.8–46.1)
HGB BLD-MCNC: 14.5 G/DL (ref 11.5–15.4)
IMM GRANULOCYTES # BLD AUTO: 0.09 THOUSAND/UL (ref 0–0.2)
IMM GRANULOCYTES NFR BLD AUTO: 1 % (ref 0–2)
LYMPHOCYTES # BLD AUTO: 0.29 THOUSANDS/ΜL (ref 0.6–4.47)
LYMPHOCYTES NFR BLD AUTO: 3 % (ref 14–44)
MCH RBC QN AUTO: 32.3 PG (ref 26.8–34.3)
MCHC RBC AUTO-ENTMCNC: 32.4 G/DL (ref 31.4–37.4)
MCV RBC AUTO: 100 FL (ref 82–98)
MONOCYTES # BLD AUTO: 0.24 THOUSAND/ΜL (ref 0.17–1.22)
MONOCYTES NFR BLD AUTO: 2 % (ref 4–12)
NEUTROPHILS # BLD AUTO: 10.53 THOUSANDS/ΜL (ref 1.85–7.62)
NEUTS SEG NFR BLD AUTO: 94 % (ref 43–75)
NRBC BLD AUTO-RTO: 0 /100 WBCS
PLATELET # BLD AUTO: 124 THOUSANDS/UL (ref 149–390)
PMV BLD AUTO: 9.6 FL (ref 8.9–12.7)
POTASSIUM SERPL-SCNC: 4.9 MMOL/L (ref 3.5–5.3)
PROCALCITONIN SERPL-MCNC: <0.05 NG/ML
RBC # BLD AUTO: 4.49 MILLION/UL (ref 3.81–5.12)
SODIUM SERPL-SCNC: 144 MMOL/L (ref 136–145)
WBC # BLD AUTO: 11.15 THOUSAND/UL (ref 4.31–10.16)

## 2022-06-11 PROCEDURE — 99239 HOSP IP/OBS DSCHRG MGMT >30: CPT | Performed by: INTERNAL MEDICINE

## 2022-06-11 PROCEDURE — 85025 COMPLETE CBC W/AUTO DIFF WBC: CPT | Performed by: INTERNAL MEDICINE

## 2022-06-11 PROCEDURE — 94760 N-INVAS EAR/PLS OXIMETRY 1: CPT

## 2022-06-11 PROCEDURE — 84145 PROCALCITONIN (PCT): CPT | Performed by: INTERNAL MEDICINE

## 2022-06-11 PROCEDURE — 80048 BASIC METABOLIC PNL TOTAL CA: CPT | Performed by: INTERNAL MEDICINE

## 2022-06-11 RX ORDER — PREDNISONE 20 MG/1
40 TABLET ORAL DAILY
Qty: 10 TABLET | Refills: 0 | Status: SHIPPED | OUTPATIENT
Start: 2022-06-11 | End: 2022-06-16

## 2022-06-11 RX ORDER — IPRATROPIUM BROMIDE AND ALBUTEROL SULFATE 2.5; .5 MG/3ML; MG/3ML
3 SOLUTION RESPIRATORY (INHALATION) EVERY 6 HOURS PRN
Qty: 360 ML | Refills: 0 | Status: SHIPPED | OUTPATIENT
Start: 2022-06-11

## 2022-06-11 RX ADMIN — CARVEDILOL 12.5 MG: 12.5 TABLET, FILM COATED ORAL at 08:07

## 2022-06-11 RX ADMIN — AZITHROMYCIN 500 MG: 500 TABLET, FILM COATED ORAL at 08:08

## 2022-06-11 RX ADMIN — ATORVASTATIN CALCIUM 40 MG: 40 TABLET, FILM COATED ORAL at 08:08

## 2022-06-11 RX ADMIN — ASPIRIN 81 MG: 81 TABLET, COATED ORAL at 08:08

## 2022-06-11 RX ADMIN — CLOPIDOGREL BISULFATE 75 MG: 75 TABLET ORAL at 08:07

## 2022-06-11 RX ADMIN — ENOXAPARIN SODIUM 40 MG: 40 INJECTION SUBCUTANEOUS at 08:07

## 2022-06-11 RX ADMIN — METHYLPREDNISOLONE SODIUM SUCCINATE 40 MG: 40 INJECTION, POWDER, FOR SOLUTION INTRAMUSCULAR; INTRAVENOUS at 08:07

## 2022-06-11 RX ADMIN — LOSARTAN POTASSIUM 25 MG: 25 TABLET, FILM COATED ORAL at 08:09

## 2022-06-11 NOTE — DISCHARGE INSTR - AVS FIRST PAGE
Take prednisone 40 mg once a day for 5 days    You may use nebulized DuoNebs up to 4 times a day as needed for shortness of breath and wheezing    Use 2 L of oxygen    Stop smoking

## 2022-06-11 NOTE — NURSING NOTE
Discharge documents given to the patient  Patient demonstrated understanding of given instructions  IV removed  Patient satisfied with the hospital stay and their staff

## 2022-06-11 NOTE — DISCHARGE SUMMARY
[facility]  Discharge- Read [place] 1950, 66 y o  female MRN: [id number]  Unit/Bed#: -01 Encounter: [id number]  Primary Care Provider: Thompson Sanchez DO   Date and time admitted to hospital: 6/9/2022 12:34 PM    Discharge diagnosis:    COPD exacerbation  Acute on Chronic hypoxic respiratory failure  Chronic diastolic heart failure  Overweight    Discharging Physician / Practitioner: Angelita Tilley MD  PCP: Thompson Sanchez DO  Admission Date:   Admission Orders (From admission, onward)     Ordered        06/09/22 1743  Inpatient Admission  Once            06/09/22 1628  Place in Observation  Once                      Discharge Date: 06/11/22     Outpatient follow-up Requested:  · Primary care physician  · Pulmonology    Complications:  None    Reason for Admission:  COPD exacerbation    Hospital Course:     Patient was hospitalized with COPD exacerbation  She was noted to have worsening of her baseline hypoxemia  She was treated with IV steroids and nebulized bronchodilators  She shows significant improvement her symptoms is essentially back to her baseline  Patient does have a nebulizer machine at home already  Patient also has oxygen at home  Patient does wish to go home  She has been discharged home in stable condition  She should follow-up with primary care physician  She should continue a course of steroids prednisone 40 mg once a day for 5 days  Should continue nebulized bronchodilators at home, prescription provided for Christopher She should follow-up with outpatient pulmonologist    Condition at Discharge: good     Discharge Day Visit / Exam:     Subjective:    Patient evaluated this morning  She feels much better and wishes to go home  She otherwise denies any nausea vomiting or diarrhea constipation  Tolerating p o  Intake    Currently on 2 L of oxygen which is her usual requirement at home according to her  Vitals: Blood Pressure: 137/71 (06/11/22 0750)  Pulse: 74 (06/11/22 0750)  Temperature: 98 1 °F (36 7 °C) (06/11/22 0750)  Temp Source: Oral (06/09/22 2017)  Respirations: 18 (06/11/22 0750)  Height: 5' 8" (172 7 cm) (06/09/22 2056)  Weight - Scale: 79 6 kg (175 lb 7 8 oz) (06/11/22 0600)  SpO2: 90 % (06/11/22 0750)     Exam:   Physical Exam  Vitals and nursing note reviewed  Constitutional:       General: She is not in acute distress  Appearance: Normal appearance  She is normal weight  She is not ill-appearing, toxic-appearing or diaphoretic  Comments: Female patient in bed, awake   HENT:      Head: Normocephalic and atraumatic  Right Ear: External ear normal       Left Ear: External ear normal       Nose: Nose normal  No congestion  Mouth/Throat:      Mouth: Mucous membranes are moist       Pharynx: Oropharynx is clear  No oropharyngeal exudate or posterior oropharyngeal erythema  Eyes:      General: No scleral icterus  Right eye: No discharge  Left eye: No discharge  Extraocular Movements: Extraocular movements intact  Conjunctiva/sclera: Conjunctivae normal       Pupils: Pupils are equal, round, and reactive to light  Cardiovascular:      Rate and Rhythm: Normal rate and regular rhythm  Pulses: Normal pulses  Heart sounds: Normal heart sounds  No murmur heard  No friction rub  No gallop  Pulmonary:      Effort: Pulmonary effort is normal  No respiratory distress  Breath sounds: Normal breath sounds  No stridor  No wheezing, rhonchi or rales  Chest:      Chest wall: No tenderness  Abdominal:      General: Abdomen is flat  Bowel sounds are normal  There is no distension  Palpations: Abdomen is soft  There is no mass  Tenderness: There is no abdominal tenderness  There is no guarding or rebound  Musculoskeletal:         General: No swelling, tenderness, deformity or signs of injury  Normal range of motion        Cervical back: Normal range of motion and neck supple  No rigidity  No muscular tenderness  Skin:     General: Skin is warm and dry  Capillary Refill: Capillary refill takes less than 2 seconds  Coloration: Skin is not jaundiced or pale  Findings: No bruising, erythema, lesion or rash  Neurological:      General: No focal deficit present  Mental Status: She is alert and oriented to person, place, and time  Mental status is at baseline  Cranial Nerves: No cranial nerve deficit  Sensory: No sensory deficit  Motor: No weakness  Coordination: Coordination normal    Psychiatric:         Mood and Affect: Mood normal          Behavior: Behavior normal          Thought Content: Thought content normal          Judgment: Judgment normal            Discussion with Family:  Discussed with patient    Discharge instructions/Information to patient and family:   See after visit summary for information provided to patient and family  Provisions for Follow-Up Care:  See after visit summary for information related to follow-up care and any pertinent home health orders  Disposition:     Home    For Discharges to [facility]:   · Not Applicable to this Patient - Not Applicable to this Patient    Planned Readmission:      Discharge Statement:  I spent 45 minutes discharging the patient  This time was spent on the day of discharge  I had direct contact with the patient on the day of discharge  Greater than 50% of the total time was spent examining patient, answering all patient questions, arranging and discussing plan of care with patient as well as directly providing post-discharge instructions  Additional time then spent on discharge activities  Discharge Medications:  See after visit summary for reconciled discharge medications provided to patient and family        ** Please Note: This note has been constructed using a voice recognition system **

## 2022-06-11 NOTE — PLAN OF CARE
Problem: Potential for Falls  Goal: Patient will remain free of falls  Description: INTERVENTIONS:  - Educate patient/family on patient safety including physical limitations  - Instruct patient to call for assistance with activity   - Consult OT/PT to assist with strengthening/mobility   - Keep Call bell within reach  - Keep bed low and locked with side rails adjusted as appropriate  - Keep care items and personal belongings within reach  - Initiate and maintain comfort rounds  - Make Fall Risk Sign visible to staff  - Apply yellow socks and bracelet for high fall risk patients  - Consider moving patient to room near nurses station  Outcome: Progressing     Problem: RESPIRATORY - ADULT  Goal: Achieves optimal ventilation and oxygenation  Description: INTERVENTIONS:  - Assess for changes in respiratory status  - Assess for changes in mentation and behavior  - Position to facilitate oxygenation and minimize respiratory effort  - Oxygen administered by appropriate delivery if ordered  - Initiate smoking cessation education as indicated  - Encourage broncho-pulmonary hygiene including cough, deep breathe, Incentive Spirometry  - Assess the need for suctioning and aspirate as needed  - Assess and instruct to report SOB or any respiratory difficulty  - Respiratory Therapy support as indicated  Outcome: Progressing

## 2022-06-11 NOTE — PLAN OF CARE
Problem: Potential for Falls  Goal: Patient will remain free of falls  Description: INTERVENTIONS:  - Educate patient/family on patient safety including physical limitations  - Instruct patient to call for assistance with activity   - Consult OT/PT to assist with strengthening/mobility   - Keep Call bell within reach  - Keep bed low and locked with side rails adjusted as appropriate  - Keep care items and personal belongings within reach  - Initiate and maintain comfort rounds  - Make Fall Risk Sign visible to staff  - Offer Toileting every 2 Hours, in advance of need  - Initiate/Maintain Bed alarm  - Obtain necessary fall risk management equipment: Bed Alarm  - Apply yellow socks and bracelet for high fall risk patients  - Consider moving patient to room near nurses station  Outcome: Progressing     Problem: RESPIRATORY - ADULT  Goal: Achieves optimal ventilation and oxygenation  Description: INTERVENTIONS:  - Assess for changes in respiratory status  - Assess for changes in mentation and behavior  - Position to facilitate oxygenation and minimize respiratory effort  - Oxygen administered by appropriate delivery if ordered  - Initiate smoking cessation education as indicated  - Encourage broncho-pulmonary hygiene including cough, deep breathe, Incentive Spirometry  - Assess the need for suctioning and aspirate as needed  - Assess and instruct to report SOB or any respiratory difficulty  - Respiratory Therapy support as indicated  Outcome: Progressing

## 2022-06-11 NOTE — RESPIRATORY THERAPY NOTE
RT Protocol Note  Read Kevin 66 y o  female MRN: [id number]  Unit/Bed#: -01 Encounter: [id number]    Assessment    Principal Problem:    Acute respiratory failure with hypoxia ([place] [address] )  Active Problems:    Chronic diastolic CHF (congestive heart failure) ([facility])    Essential hypertension    Tobacco use disorder    COPD exacerbation ([facility])    History of cancer    Coronary artery disease      Home Pulmonary Medications:  inhalers  Home Devices/Therapy: Home O2    Past Medical History:   Diagnosis Date    Cancer ([place] [address] )     bladder    COPD (chronic obstructive pulmonary disease) ([facility])     Hyperlipidemia     Hypertension     Myocardial infarction ([place] [address] )     2010     Social History     Socioeconomic History    Marital status: /Civil Union     Spouse name: None    Number of children: None    Years of education: None    Highest education level: None   Occupational History    None   Tobacco Use    Smoking status: Current Every Day Smoker     Packs/day: 0 50     Types: Cigarettes    Smokeless tobacco: Never Used   Vaping Use    Vaping Use: Never used   Substance and Sexual Activity    Alcohol use: Yes     Comment: socailly    Drug use: No    Sexual activity: None   Other Topics Concern    None   Social History Narrative    None     Social Determinants of Health     Financial Resource Strain: Not on file   Food Insecurity: No Food Insecurity    Worried About Running Out of Food in the Last Year: Never true    Karla of Food in the Last Year: Never true   Transportation Needs: No Transportation Needs    Lack of Transportation (Medical): No    Lack of Transportation (Non-Medical):  No   Physical Activity: Not on file   Stress: Not on file   Social Connections: Not on file   Intimate Partner Violence: Not on file   Housing Stability: Low Risk     Unable to Pay for Housing in the Last Year: No    Number of Places Lived in the Last Year: 1    Unstable Housing in the Last Year: No Subjective    Subjective Data: awake and alert    Objective    Physical Exam:   Assessment Type: Assess only  General Appearance: Alert, Awake  Respiratory Pattern: Shallow, Spontaneous  Chest Assessment: Chest expansion symmetrical  Bilateral Breath Sounds: Diminished, Scattered, Coarse  Cough: None  O2 Device: nasal cannula    Vitals:  Blood pressure 138/73, pulse 91, temperature 98 3 °F (36 8 °C), resp  rate 16, height 5' 8" (1 727 m), weight 79 6 kg (175 lb 7 8 oz), SpO2 92 %  Imaging and other studies: I have personally reviewed pertinent reports        O2 Device: nasal cannula     Plan    Respiratory Plan: Home Bronchodilator Patient pathway        Resp Comments: respiratory assessment completed at [facility] admitted for acute respiratory failure with hypoxia awake an [facility] apparent respiratory distress no indication or request for MDI therapy needed at this time
